# Patient Record
Sex: MALE | Race: WHITE | NOT HISPANIC OR LATINO | Employment: FULL TIME | URBAN - NONMETROPOLITAN AREA
[De-identification: names, ages, dates, MRNs, and addresses within clinical notes are randomized per-mention and may not be internally consistent; named-entity substitution may affect disease eponyms.]

---

## 2023-01-05 ENCOUNTER — HOSPITAL ENCOUNTER (EMERGENCY)
Facility: HOSPITAL | Age: 63
Discharge: HOME | End: 2023-01-05
Attending: EMERGENCY MEDICINE

## 2023-01-05 ENCOUNTER — APPOINTMENT (EMERGENCY)
Dept: RADIOLOGY | Facility: HOSPITAL | Age: 63
End: 2023-01-05

## 2023-01-05 VITALS
OXYGEN SATURATION: 97 % | RESPIRATION RATE: 17 BRPM | HEIGHT: 74 IN | WEIGHT: 230 LBS | SYSTOLIC BLOOD PRESSURE: 199 MMHG | BODY MASS INDEX: 29.52 KG/M2 | TEMPERATURE: 97.8 F | HEART RATE: 55 BPM | DIASTOLIC BLOOD PRESSURE: 100 MMHG

## 2023-01-05 DIAGNOSIS — M25.561 RIGHT KNEE PAIN: Primary | ICD-10-CM

## 2023-01-05 RX ORDER — KETOROLAC TROMETHAMINE 30 MG/ML
15 INJECTION, SOLUTION INTRAMUSCULAR; INTRAVENOUS ONCE
Status: COMPLETED | OUTPATIENT
Start: 2023-01-05 | End: 2023-01-05

## 2023-01-05 RX ADMIN — KETOROLAC TROMETHAMINE 15 MG: 30 INJECTION, SOLUTION INTRAMUSCULAR; INTRAVENOUS at 20:01

## 2023-01-06 NOTE — DISCHARGE INSTRUCTIONS
Please utilize brace and crutches as we discussed  Follow-up with sports medicine  Continue with ice, rest, Tylenol alternating with ibuprofen  Please follow-up with your family doctor as your blood pressure was elevated on arrival and this should be rechecked    Return with any new or worsening symptoms

## 2023-01-06 NOTE — ED PROVIDER NOTES
History  Chief Complaint   Patient presents with   • Knee Pain     Pt having continued knee pain x 2 weeks from an injury  Pt stepped up into truck tonight and he heard a pop  Pt unable to apply pressure and unable to bend right knee  76-year-old male presented emergency department for evaluation of right knee pain  Patient states pain started back in the summer when he was kneeling staining his deck  Ports his PCP thought this may be a minor meniscal tear he was treating with RICE therapy  Patient states symptoms were improving until 2 weeks ago he was kneeling on the knee again reinjured the knee  Patient states he has been again treating with RICE therapy  Reports today when he stepped up into his truck he heard a pop in the knee  States he has been unable to apply pressure to the right knee at this time  Reports difficulty flexing the knee  Denies any weakness or numbness  He denies any tingling  Reports some mild swelling  He is wearing a brace  History provided by:  Patient  Knee Pain  Location:  Knee  Injury: yes    Mechanism of injury comment:  Kneeling on the knee // steping into truck  Knee location:  R knee  Pain details:     Quality:  Shooting    Severity:  Moderate    Onset quality:  Sudden    Timing:  Constant  Relieved by: Ice and rest  Worsened by:  Bearing weight and flexion  Ineffective treatments:  None tried  Associated symptoms: decreased ROM and swelling    Associated symptoms: no back pain, no fatigue, no fever, no itching, no muscle weakness, no neck pain, no numbness, no stiffness and no tingling        None       History reviewed  No pertinent past medical history  History reviewed  No pertinent surgical history  History reviewed  No pertinent family history  I have reviewed and agree with the history as documented      E-Cigarette/Vaping     E-Cigarette/Vaping Substances     Social History     Tobacco Use   • Smoking status: Never   • Smokeless tobacco: Never Substance Use Topics   • Alcohol use: Never   • Drug use: Never       Review of Systems   Constitutional: Negative for fatigue and fever  Musculoskeletal: Positive for arthralgias and joint swelling  Negative for back pain, neck pain and stiffness  Skin: Negative  Negative for itching  All other systems reviewed and are negative  Physical Exam  Physical Exam  Vitals and nursing note reviewed  Constitutional:       General: He is not in acute distress  Appearance: Normal appearance  He is not ill-appearing, toxic-appearing or diaphoretic  HENT:      Head: Normocephalic  Eyes:      Conjunctiva/sclera: Conjunctivae normal    Cardiovascular:      Pulses:           Dorsalis pedis pulses are 2+ on the right side  Posterior tibial pulses are 2+ on the right side  Pulmonary:      Effort: Pulmonary effort is normal    Musculoskeletal:         General: Swelling and tenderness present  Right hip: Normal       Right knee: Swelling and bony tenderness present  No deformity, erythema or ecchymosis  Decreased range of motion  Tenderness present over the medial joint line and lateral joint line  Right ankle: Normal    Skin:     General: Skin is warm and dry  Capillary Refill: Capillary refill takes less than 2 seconds  Findings: No bruising, erythema or rash  Neurological:      General: No focal deficit present  Mental Status: He is alert and oriented to person, place, and time     Psychiatric:         Mood and Affect: Mood normal          Behavior: Behavior normal          Vital Signs  ED Triage Vitals   Temperature Pulse Respirations Blood Pressure SpO2   01/05/23 1838 01/05/23 1838 01/05/23 1838 01/05/23 1838 01/05/23 1838   97 8 °F (36 6 °C) 55 17 (!) 199/100 97 %      Temp Source Heart Rate Source Patient Position - Orthostatic VS BP Location FiO2 (%)   01/05/23 1838 01/05/23 1838 01/05/23 1838 01/05/23 1838 --   Temporal Monitor Standing Right arm       Pain Score 01/05/23 2048       4           Vitals:    01/05/23 1838   BP: (!) 199/100   Pulse: 55   Patient Position - Orthostatic VS: Standing         Visual Acuity      ED Medications  Medications   ketorolac (TORADOL) injection 15 mg (15 mg Intramuscular Given 1/5/23 2001)       Diagnostic Studies  Results Reviewed     None                 XR knee 4+ vw right injury   ED Interpretation by Josr Vázquez PA-C (01/05 2045)   No acute osseous injury                 Procedures  Splint application    Date/Time: 1/5/2023 8:43 PM  Performed by: Josr Vázquez PA-C  Authorized by: Josr Vázquez PA-C   Universal Protocol:  Procedure performed by: Susanna Brennan)  Risks and benefits: risks, benefits and alternatives were discussed  Consent given by: patient  Time out: Immediately prior to procedure a "time out" was called to verify the correct patient, procedure, equipment, support staff and site/side marked as required  Timeout called at: 1/5/2023 8:44 PM   Patient understanding: patient states understanding of the procedure being performed  Patient consent: the patient's understanding of the procedure matches consent given  Radiology Images displayed and confirmed  If images not available, report reviewed: imaging studies available  Patient identity confirmed: verbally with patient and arm band      Pre-procedure details:     Sensation:  Normal  Procedure details:     Laterality:  Right    Location:  Knee    Knee:  R knee    Splint type: static knee immoblizer  Supplies:  Knee immobilizer  Post-procedure details:     Pain:  Unchanged    Sensation:  Normal    Patient tolerance of procedure: Tolerated well, no immediate complications             ED Course  ED Course as of 01/05/23 2049   Thu Jan 05, 2023 2045 ED interpretation of knee x-ray negative for acute osseous injury  Knee immobilizer placed  Patient has crutches    We will follow-up with sports medicine                               SBIRT 20yo+    Flowsheet Row Most Recent Value   SBIRT (22 yo +)    In order to provide better care to our patients, we are screening all of our patients for alcohol and drug use  Would it be okay to ask you these screening questions? No Filed at: 01/05/2023 1842                    Medical Decision Making  34-year-old male presented to the emergency department for evaluation of right knee pain  Acute on chronic injury  Vitals and medical record reviewed  On exam patient has some mild swelling and tenderness to the right knee  He has pain with flexing  ED interpretation of x-ray was negative for acute osseous injury  There is no warmth or redness concerning for infection  Patient denies any fevers  Patient was treated with knee immobilizer has his own crutches  Concern for an ligament injury  Will have patient follow-up with sports medicine  We discussed rice therapy  We discussed appropriate follow-up and strict return precautions and he verbalized understanding  He was clinically and hemodynamically stable for discharge  Of note patient was made aware of elevated blood pressure on arrival and educated to follow-up with his PCP to have this rechecked  Right knee pain: acute illness or injury  Amount and/or Complexity of Data Reviewed  Radiology: ordered and independent interpretation performed  Risk  OTC drugs  Prescription drug management  Risk Details: Discussed alternate between Tylenol and ibuprofen as needed for pain control at home        Disposition  Final diagnoses:   Right knee pain     Time reflects when diagnosis was documented in both MDM as applicable and the Disposition within this note     Time User Action Codes Description Comment    1/5/2023  8:35 PM Promise Dunn Right knee pain       ED Disposition     ED Disposition   Discharge    Condition   Stable    Date/Time   Thu Jan 5, 2023  8:35 PM    Fito8 Abraham Perez discharge to home/self care                 Follow-up Information Follow up With Specialties Details Why [id number] aTlha Rehman MD [facility]   Walker Kcarez [address] Orthopedic Surgery   [address]  [address] [address]  Denia Diop MD Sports Medicine   [address]  [address]  [facility] [address] [facility]  [phone]            Patient's Medications    No medications on file           PDMP Review     None          ED Provider  Electronically Signed by           Herlinda Cabrera PA-C  01/05/23 4977

## 2023-01-09 VITALS
HEIGHT: 74 IN | DIASTOLIC BLOOD PRESSURE: 90 MMHG | TEMPERATURE: 97.7 F | SYSTOLIC BLOOD PRESSURE: 180 MMHG | HEART RATE: 99 BPM | BODY MASS INDEX: 29.52 KG/M2 | WEIGHT: 230 LBS

## 2023-01-09 DIAGNOSIS — R03.0 ELEVATED BLOOD PRESSURE READING: ICD-10-CM

## 2023-01-09 DIAGNOSIS — G89.29 CHRONIC PAIN OF RIGHT KNEE: Primary | ICD-10-CM

## 2023-01-09 DIAGNOSIS — M25.661 KNEE STIFFNESS, RIGHT: ICD-10-CM

## 2023-01-09 DIAGNOSIS — S89.91XA INJURY OF RIGHT KNEE, INITIAL ENCOUNTER: ICD-10-CM

## 2023-01-09 DIAGNOSIS — M25.561 CHRONIC PAIN OF RIGHT KNEE: Primary | ICD-10-CM

## 2023-01-09 DIAGNOSIS — M17.12 PRIMARY OSTEOARTHRITIS OF LEFT KNEE: ICD-10-CM

## 2023-01-09 RX ORDER — AMLODIPINE BESYLATE 5 MG/1
5 TABLET ORAL DAILY
COMMUNITY
Start: 2023-01-02

## 2023-01-09 NOTE — PROGRESS NOTES
1  Chronic pain of right knee  Ambulatory Referral to Sports Medicine    Ambulatory Referral to Physical Therapy      2  Injury of right knee, initial encounter  Ambulatory Referral to Physical Therapy      3  Knee stiffness, right  Ambulatory Referral to Physical Therapy      4  Primary osteoarthritis of left knee  Ambulatory Referral to Physical Therapy      5  Elevated blood pressure reading          Orders Placed This Encounter   Procedures   • Ambulatory Referral to Physical Therapy        Imaging Studies (I personally reviewed images in PACS and report):    • X-ray right knee 1/5/2023: No acute osseous abnormalities  No joint effusion  Mild osteoarthritic changes with small marginal osteophytes  Atherosclerotic calcifications  IMPRESSION:  • Acute on chronic right knee pain  • Reportedly injured in the summer 2022 while kneeling feeling a popping sensation  He was mainly located over the anteromedial aspect of his knee  Treated conservatively at first but then reaggravated the pain recently over the past few weeks while pushing on his right foot while attempting to go into a truck  • Radiographic imaging unremarkable other than degenerative changes  • Pain improving since injury but does have expected general knee stiffness due to knee immobilizer use and crutches  • Differential includes MCL sprain, osteoarthritic pain, medial meniscal injury/flare, Baker's cyst disruption/proximal calf strain  • Separate issue-elevated blood pressure reading-likely from whitecoat syndrome versus uncontrolled hypertension    PLAN:    • Clinical exam and radiographic imaging reviewed with patient today, with impression as per above  I have discussed with the patient the pathophysiology of this diagnosis and reviewed how the examination correlates with this diagnosis  • Prior imaging reviewed of his right knee as noted above with patient    • Recommended patient transition out of his knee immobilizer brace and weight-bear as tolerated without use of crutches  I counseled he can continue use of his hinged knee brace to provide support while recovering  • In regards to pain control recommended  Use of acetaminophen, NSAIDs, heat/ice therapy 20 minutes on/off  Offered an increased dose of naproxen, but patient deferred  I did offer a an intra-articular cortisone injection but counseled that he would likely not be able to undergo surgical intervention for his knee for at least 6 to 8 weeks after his injection if it does not provide sufficient relief  Patient preferred to defer this option for now as his pain has been improving  • Furthermore, in regards to improving his pain and overall function of his right knee, I have referred him to formal physical therapy as well  • Patient declined need for work accommodations note  • Regards was elevated blood pressure reading, I recommended he obtain his blood pressure reading when he was at home and if he continues to have elevated blood pressure readings greater than 130/90 consistently, that he should reach out to his PCP  Return in about 3 weeks (around 1/30/2023)  If patient is improving, I recommend he continue physical therapy  If he is not improving or worsening, may consider obtaining an MRI of his right knee without contrast or providing an intra-articular cortisone injection  Portions of the record may have been created with voice recognition software  Occasional wrong word or "sound a like" substitutions may have occurred due to the inherent limitations of voice recognition software  Read the chart carefully and recognize, using context, where substitutions have occurred           CHIEF COMPLAINT:  Chief Complaint   Patient presents with   • Right Knee - Pain         HPI:  Mikhail Ivory is a 60 y o  male  who presents with his significant other for       Visit 1/9/2023 :  Initial evaluation of right knee pain:  Patient reports an old injury from the summer 2022 while kneeling  He states that he was working on his deck and felt a tearing sensation over the anteromedial aspect of his knee  He states he had seen his PCP who had considered a medial meniscal injury as a possible cause and treated him with conservative treatments  He states he has been doing well until approximately 2 and half weeks ago when patient was attempting to step up into his truck  He states he was trying to push off his right foot and twisted his knee which caused a popping sensation and immediate pain over the medial and posterior aspect of his knee that brought him to tears reportedly  He states he had difficulty weightbearing at first and had swelling  He then treated by using a hinged knee brace along with icing, NSAIDs, acetaminophen all of which provide relief  He states he eventually went to the ER on 1/5/2023 where he had imaging done as noted above  He was then placed in a knee immobilizer and crutches and has not been weightbearing since being in the ER while using the knee immobilizer brace  He states today that his pain has been progressively improving and that he generally has a general sense of discomfort throughout his knee along with tightness  He states the swelling is improved  He states when he does have pain is over the posterior aspect of his knee  Describes as an aching pain  Denies any discoloration of his knee, numbness/tingling  He states he did take an Aleve this morning but only because he thought he had to and not that he was in severe pain  He has not seen formal physical therapy for this issue before  He states he did play football in his younger years for 10+ years but does not feel he was diagnosed with any injury and did not undergo any surgery of his right knee in the past   Denies F/C  Initial elevated blood pressure reading today  Patient denies chest pain, shortness of breath, headaches    He states when he checks his blood pressure at home is much more improved  He states he was nervous for his visit today  Medical, Surgical, Family, and Social History    Past Medical History:   Diagnosis Date   • Hypertension      Past Surgical History:   Procedure Laterality Date   • HAND SURGERY       Social History   Social History     Substance and Sexual Activity   Alcohol Use Never     Social History     Substance and Sexual Activity   Drug Use Never     Social History     Tobacco Use   Smoking Status Never   Smokeless Tobacco Never     History reviewed  No pertinent family history  No Known Allergies       Physical Exam  BP (!) 180/90 (BP Location: Left arm)   Pulse 99   Temp 97 7 °F (36 5 °C) (Temporal)   Ht 6' 2" (1 88 m)   Wt 104 kg (230 lb)   BMI 29 53 kg/m²     Constitutional:  see vital signs  Gen: well-developed, normocephalic/atraumatic, well-groomed  Pulmonary/Chest: Effort normal  No respiratory distress  Ortho Exam  Right Knee Exam: Patient was seen today in a knee immobilizer along with crutches avoiding weightbearing on his right lower extremity    This was removed for examination  Erythema: no  Swelling: no  Increased Warmth: no  Tenderness: none  ROM: 0-100 (patient is able to actively and I am able to passively flex his knee further, but he states pain over his general knee worsens with further flexion)  Knee flexion strength: 5/5  Knee extension strength: 5/5  Patellar Apprehension: negative  Patellar Grind: negative  Lachman's: negative  Anterior Drawer: negative  Varus laxity: negative, but +pain  Valgus laxity: negative, but +pain  [facility]: negative   Thessaly Test: +pain  Dial Test: negative        Sensation intact to light touch      Right hip ROM demonstrates no pain actively or passively      RIGHT ACHILLES EXAMINATION:  Simmonds’ Triad:  Palpable Gap or Defect of Achilles: none  Angle of Declination: angle of baseline plantarflexion symmetric to contralateral side  Matles Test (patient prone, intact and symmetric plantarflexion of ankle when flexing knee): intact  Akins's Calf Squeeze Test: intact obligatory plantarflexion          Procedures

## 2023-01-12 ENCOUNTER — EVALUATION (OUTPATIENT)
Dept: PHYSICAL THERAPY | Facility: CLINIC | Age: 63
End: 2023-01-12

## 2023-01-12 DIAGNOSIS — M25.661 KNEE STIFFNESS, RIGHT: ICD-10-CM

## 2023-01-12 DIAGNOSIS — M25.561 CHRONIC PAIN OF RIGHT KNEE: ICD-10-CM

## 2023-01-12 DIAGNOSIS — G89.29 CHRONIC PAIN OF RIGHT KNEE: ICD-10-CM

## 2023-01-12 DIAGNOSIS — M17.12 PRIMARY OSTEOARTHRITIS OF LEFT KNEE: ICD-10-CM

## 2023-01-12 DIAGNOSIS — S89.91XA INJURY OF RIGHT KNEE, INITIAL ENCOUNTER: ICD-10-CM

## 2023-01-12 NOTE — PROGRESS NOTES
PT Evaluation     Today's date: 2023  Patient name: Heather Martin  : 1960  MRN: [id number]  Referring provider: Travon Padron MD  Dx:   Encounter Diagnosis     ICD-10-CM    1  Chronic pain of right knee  M25 561 Ambulatory Referral to Physical Therapy    G89 29       2  Injury of right knee, initial encounter  S89  91XA Ambulatory Referral to Physical Therapy      3  Knee stiffness, right  M25 661 Ambulatory Referral to Physical Therapy      4  Primary osteoarthritis of left knee  M17 12 Ambulatory Referral to Physical Therapy          Start Time: 1555  Stop Time: 1640  Total time in clinic (min): 45 minutes    Assessment  Assessment details: Pt is a 60year old male who presents to OP PT for chronic R knee pain due to several injury episodes over the past few months  Upon examination, patient presents with decreased ROM with stiffness at end range, decreased patellar mobility, decreased strength and fair quad strength  Due to his current impairments patient has difficulty with prolonged standing, ambulation and stairs  Pt would benefit from OP PT services in order to address current impairments and functional limitations  Thank you for your referral!    Impairments: abnormal or restricted ROM, activity intolerance, impaired balance, impaired physical strength, lacks appropriate home exercise program and pain with function    Goals  STG (to be met within 4 weeks):  1  Pt will improve R knee pain to no more than 2/10 at worst in order to improve gait quality  2  Pt will improve R knee ROM by at least 5 * in order to normalize gait pattern  3  Pt will improve quadricep contraction to good in order to improve stability in SL stance  4  Pt will improve R knee strength by at least 1/2 grade in order to negotiate curb step  6  Pt to improve FOTO score by at least 10 points in order to progress to PLOF    LTG (to bet met in 10 weeks):  1   Pt will be able to perform all activities without R knee pain in order to maximize independence  2  Pt will restore WFL R knee ROM in order to perform all functional activities  3  Pt will restore WFL R knee strength in order to return to hobbies  4  Pt will be able to ascend/descend 1 flight of stairs with reciprocal gait pattern in order to return PLOF  5  Pt to meet FOTO discharge score in order to improve QOL and maximize independence        Plan  Patient would benefit from: skilled physical therapy  Planned modality interventions: thermotherapy: hydrocollator packs, unattended electrical stimulation and cryotherapy  Planned therapy interventions: joint mobilization, manual therapy, patient education, neuromuscular re-education, strengthening, stretching, therapeutic exercise, home exercise program and balance  Frequency: 2x week  Duration in weeks: 6  Treatment plan discussed with: patient        Subjective Evaluation    History of Present Illness  Mechanism of injury: Pt reports staining his deck in July of 2022 when his knee slipped off the foam pad  He was seeing his PCP a week later and mentioned his knee pain, he was told it may be a injury to his meniscus  He wore a copper fit brace for some support  Between [facility] and [place] he had to knee on his knees again and felt sharp pain  He was unable to bear much weight onto his knee  He then went to get into his truck one week ago when he felt a posterior pop in his knee with severe pain  He drove home and was seen in ED  He was put into an immobilizer with crutches followed up with ortho  Pt was given the option of injection or therapy; chose therapy and will f/u with referring provider on 2/2      Diagnostic Tests  X-ray: normal (normal degenerative changes)  Treatments  Current treatment: physical therapy  Patient Goals  Patient goals for therapy: increased strength, independence with ADLs/IADLs, return to sport/leisure activities, increased motion, decreased pain and improved balance          Objective     Palpation     Right Tenderness of the lateral gastrocnemius and medial gastrocnemius  Tenderness     Right Knee   No tenderness in the lateral joint line and medial joint line  Neurological Testing     Sensation     Knee   Left Knee   Intact: light touch    Right Knee   Intact: light touch     Reflexes   Left   Patellar (L4): normal (2+)  Achilles (S1): normal (2+)    Right   Patellar (L4): normal (2+)  Achilles (S1): normal (2+)    Active Range of Motion   Left Knee   Normal active range of motion    Right Knee   Flexion: 121 degrees   Extension: 6 degrees     Passive Range of Motion     Right Knee   Flexion: WFL and with pain  Extension: 3 degrees with pain    Strength/Myotome Testing     Left Knee   Flexion: 4+  Extension: 4+  Quadriceps contraction: good    Right Knee   Flexion: 4  Extension: 4  Quadriceps contraction: fair      Flowsheet Rows    Flowsheet Row Most Recent Value   PT/OT G-Codes    Current Score 49   Projected Score 71             Precautions:     Manuals 1/12       PROM c mobz        Patellar Mobility                Neuro Re-Ed        Quad set supine                TherEx        Bike        SAQ        SLR F/A/E        SL mayela Armenta        Lungflaca (Fwd/Lat/Retro)                Instructed HEP & education 15'                       Modalities                   Access Code: [email or web address]  URL: [email or web address]  Date: 01/12/2023  Prepared by: Kenny Favors    Exercises  Supine Quad Set - 2 x daily - 7 x weekly - 2 sets - 10 reps  Supine Short Arc Quad - 2 x daily - 7 x weekly - 2 sets - 10 reps  Gastroc Stretch on Wall - 2 x daily - 7 x weekly - 1 sets - 5 reps - :15 hold

## 2023-01-16 ENCOUNTER — OFFICE VISIT (OUTPATIENT)
Dept: PHYSICAL THERAPY | Facility: CLINIC | Age: 63
End: 2023-01-16

## 2023-01-16 DIAGNOSIS — M25.661 KNEE STIFFNESS, RIGHT: ICD-10-CM

## 2023-01-16 DIAGNOSIS — M25.561 CHRONIC PAIN OF RIGHT KNEE: Primary | ICD-10-CM

## 2023-01-16 DIAGNOSIS — M17.12 PRIMARY OSTEOARTHRITIS OF LEFT KNEE: ICD-10-CM

## 2023-01-16 DIAGNOSIS — S89.91XA INJURY OF RIGHT KNEE, INITIAL ENCOUNTER: ICD-10-CM

## 2023-01-16 DIAGNOSIS — G89.29 CHRONIC PAIN OF RIGHT KNEE: Primary | ICD-10-CM

## 2023-01-16 NOTE — PROGRESS NOTES
Daily Note     Today's date: 2023  Patient name: Jakob Frausto  : 1960  MRN: [id number]  Referring provider: Mechelle Odonnell MD  Dx:   Encounter Diagnosis     ICD-10-CM    1  Chronic pain of right knee  M25 561     G89 29       2  Injury of right knee, initial encounter  S89  91XA       3  Knee stiffness, right  M25 661       4  Primary osteoarthritis of left knee  M17 12                      Subjective: Pt notes HEP is going well, continues with popping at lateral patella      Objective: See treatment diary below      Assessment:  Pt tolerated treatment session fairly well  PT notes inconsistent painful, popping of lateral patella when transitioning into and out of TKE  Modified exercises appropriately to decrease symptoms  Pt would benefit from continued OP PT services  Plan: Continue per plan of care  Precautions:     Manuals       PROM c mobz, quad stretching  10'      Patellar Mobility  5'              Neuro Re-Ed        Quad set supine  15x :05      Wobble board  1' ea                      TherEx        [facility] 10'  L1      LAQ/SAQ  4# 2x10, 2x10      SLR F/A/E        SL clamshells  GTB 2x10      Bridges  2x10      Leg press DL, SL  65# 2x10, 45# 2x10      Lunges (Fwd/Lat/Retro)                Instructed HEP & education 15'                       Modalities                   Access Code: [email or web address]  URL: [email or web address]  Date: 2023  Prepared by: Ignacio How    Exercises  Supine Quad Set - 2 x daily - 7 x weekly - 2 sets - 10 reps  Supine Short Arc Quad - 2 x daily - 7 x weekly - 2 sets - 10 reps  Gastroc Stretch on Wall - 2 x daily - 7 x weekly - 1 sets - 5 reps - :15 hold

## 2023-01-18 ENCOUNTER — OFFICE VISIT (OUTPATIENT)
Dept: PHYSICAL THERAPY | Facility: CLINIC | Age: 63
End: 2023-01-18

## 2023-01-18 DIAGNOSIS — M25.561 CHRONIC PAIN OF RIGHT KNEE: Primary | ICD-10-CM

## 2023-01-18 DIAGNOSIS — G89.29 CHRONIC PAIN OF RIGHT KNEE: Primary | ICD-10-CM

## 2023-01-18 DIAGNOSIS — M25.661 KNEE STIFFNESS, RIGHT: ICD-10-CM

## 2023-01-18 DIAGNOSIS — S89.91XA INJURY OF RIGHT KNEE, INITIAL ENCOUNTER: ICD-10-CM

## 2023-01-18 DIAGNOSIS — M17.12 PRIMARY OSTEOARTHRITIS OF LEFT KNEE: ICD-10-CM

## 2023-01-18 NOTE — PROGRESS NOTES
Daily Note     Today's date: 2023  Patient name: Franklin Trejo  : 1960  MRN: [id number]  Referring provider: Ingrid Rosales MD  Dx:   Encounter Diagnosis     ICD-10-CM    1  Chronic pain of right knee  M25 561     G89 29       2  Injury of right knee, initial encounter  S89  91XA       3  Knee stiffness, right  M25 661       4  Primary osteoarthritis of left knee  M17 12                      Subjective: Patient reports clicking, popping in the lateral,posterior R knee today  Objective: See treatment diary below      Assessment: Tolerated treatment well  Patient exhibited good technique with therapeutic exercises and would benefit from continued PT to increase R knee ROM/strength and endurance to improve mobility  Plan: Continue per plan of care  Precautions:     Manuals      PROM c mobz, quad stretching  10' 10'w/STM     Patellar Mobility  5' 10'             Neuro Re-Ed        Quad set supine  15x :05 15x5"     Wobble board  1' ea 1'ea                     TherEx        Bike  [address] [facility] 10'  L1 [address] [facility] L1     LAQ/SAQ  4# 2x10, 2x10 LAQ 4# 2x10     SLR F/A/E        SL clamshells  GTB 2x10 GTB 2x10     Bridges  2x10 2x10     Leg press DL, SL  65# 2x10, 45# 2x10 65# 2x10  45# 2x10     Gastroc/SoleusStretch on Step   3x15" ea bilat  6"step      Lunges (Fwd/Lat/Retro)                Instructed HEP & education 15'                       Modalities                   Access Code: [email or web address]  URL: [email or web address]  Date: 2023  Prepared by: Krishna Lassiter    Exercises  Supine Quad Set - 2 x daily - 7 x weekly - 2 sets - 10 reps  Supine Short Arc Quad - 2 x daily - 7 x weekly - 2 sets - 10 reps  Gastroc Stretch on Wall - 2 x daily - 7 x weekly - 1 sets - 5 reps - :15 hold

## 2023-01-23 ENCOUNTER — OFFICE VISIT (OUTPATIENT)
Dept: PHYSICAL THERAPY | Facility: CLINIC | Age: 63
End: 2023-01-23

## 2023-01-23 DIAGNOSIS — M17.12 PRIMARY OSTEOARTHRITIS OF LEFT KNEE: ICD-10-CM

## 2023-01-23 DIAGNOSIS — M25.561 CHRONIC PAIN OF RIGHT KNEE: Primary | ICD-10-CM

## 2023-01-23 DIAGNOSIS — G89.29 CHRONIC PAIN OF RIGHT KNEE: Primary | ICD-10-CM

## 2023-01-23 DIAGNOSIS — S89.91XA INJURY OF RIGHT KNEE, INITIAL ENCOUNTER: ICD-10-CM

## 2023-01-23 DIAGNOSIS — M25.661 KNEE STIFFNESS, RIGHT: ICD-10-CM

## 2023-01-23 NOTE — PROGRESS NOTES
Daily Note     Today's date: 2023  Patient name: Reyna Andrea  : 1960  MRN: [id number]  Referring provider: Brandon Oscar MD  Dx:   Encounter Diagnosis     ICD-10-CM    1  Chronic pain of right knee  M25 561     G89 29       2  Injury of right knee, initial encounter  S89  91XA       3  Knee stiffness, right  M25 661       4  Primary osteoarthritis of left knee  M17 12                      Subjective: Patient reports that he is tolerating his PT well with only minor soreness  "The only thing I'm minding today is cramping in my L hamstring "  Patient reports decrease in episodes with his R knee locking up  Objective: See treatment diary below      Assessment: Tolerated treatment well  Patient exhibited good technique with therapeutic exercises and would benefit from continued PT to increase R knee ROM/strength and endurance to improve mobility  Plan: Continue per plan of care  Precautions:     Manuals     PROM c mobz, quad stretching  10' 10'w/STM 10' w/STM    Patellar Mobility  5' 10' 5'            Neuro Re-Ed       Quad set supine  15x :05 15x5" 2x10 5"hold    Wobble board  1' ea 1'ea 1'ea                    TherEx       Bike  [address] [facility] 10'  L1 [address] [facility] L1 [address] [facility] 10'L1    LAQ/SAQ  4# 2x10, 2x10 LAQ 4# 2x10 LAQ 4# 2x10    SLR F/A/E        SL clamshells  GTB 2x10 GTB 2x10 2x10 GTB    Bridges  2x10 2x10 2x10 GTB    Leg press DL, SL  65# 2x10, 45# 2x10 65# 2x10  45# 2x10 65# 2x10  55# 2x10    Gastroc/SoleusStretch on Step   3x15" ea bilat  6"step  3x15"ea  6"step    Lunges (Fwd/Lat/Retro)                Instructed HEP & education 15'                       Modalities                  Access Code: [email or web address]  URL: [email or web address]  Date: 2023  Prepared by: Reather     Exercises  Supine Quad Set - 2 x daily - 7 x weekly - 2 sets - 10 reps  Supine Short Arc Quad - 2 x daily - 7 x weekly - 2 sets - 10 reps  Gastroc Stretch on Wall - 2 x daily - 7 x weekly - 1 sets - 5 reps - :15 hold

## 2023-01-25 ENCOUNTER — OFFICE VISIT (OUTPATIENT)
Dept: PHYSICAL THERAPY | Facility: CLINIC | Age: 63
End: 2023-01-25

## 2023-01-25 DIAGNOSIS — G89.29 CHRONIC PAIN OF RIGHT KNEE: Primary | ICD-10-CM

## 2023-01-25 DIAGNOSIS — S89.91XA INJURY OF RIGHT KNEE, INITIAL ENCOUNTER: ICD-10-CM

## 2023-01-25 DIAGNOSIS — M17.12 PRIMARY OSTEOARTHRITIS OF LEFT KNEE: ICD-10-CM

## 2023-01-25 DIAGNOSIS — M25.561 CHRONIC PAIN OF RIGHT KNEE: Primary | ICD-10-CM

## 2023-01-25 DIAGNOSIS — M25.661 KNEE STIFFNESS, RIGHT: ICD-10-CM

## 2023-01-25 NOTE — PROGRESS NOTES
Daily Note     Today's date: 2023  Patient name: Jossue Newby  : 1960  MRN: [id number]  Referring provider: Emperatriz Stewart MD  Dx:   Encounter Diagnosis     ICD-10-CM    1  Chronic pain of right knee  M25 561     G89 29       2  Injury of right knee, initial encounter  S89  91XA       3  Knee stiffness, right  M25 661       4  Primary osteoarthritis of left knee  M17 12                      Subjective: Patient reports that he is pleased with his progress  "My knee has not been locking since I've been coming for PT "  "Even my coworkers have noticed that my walking has improved "      Objective: See treatment diary below      Assessment: Tolerated treatment well  Patient exhibited good technique with therapeutic exercises and would benefit from continued PT to increase R knee ROM/strength and endurance to improve mobility  Decreased spasm palpated in posterior R distal hamstring/proximal gastroc region since start of PT  Plan: Continue per plan of care        Precautions:     Manuals    PROM c mobz, quad stretching  10' 10'w/STM 10' w/STM 10' w/STM   Patellar Mobility  5' 10' 5' 5'           Neuro Re-Ed      Quad set supine  15x :05 15x5" 2x10 5"hold 2x10 5"hold   Wobble board  1' ea 1'ea 1'ea NV                   TherEx      Bike  [address] [facility] 10'  L1 [address] [facility] L1 [address] [facility] 10'L1 [facility] 10'L5   LAQ/SAQ  4# 2x10, 2x10 LAQ 4# 2x10 LAQ 4# 2x10 LAQ 4# 2x10   SLR F/A/E        SL clamshells  GTB 2x10 GTB 2x10 2x10 GTB 2x10 GTB   Bridges  2x10 2x10 2x10 GTB 2x10 GTB   Leg press DL, SL  65# 2x10, 45# 2x10 65# 2x10  45# 2x10 65# 2x10  55# 2x10 75# 2x10  55# 2x10   Gastroc/SoleusStretch on Step   3x15" ea bilat  6"step  3x15"ea  6"step 3x15"ea 6"step   Lunges (Fwd/Lat/Retro)        [employer] Walkout     Fwd, Retro 15# 3 Laps   Instructed HEP & education 15'                       Modalities                 Access Code: [email or web address]  URL: [email or web address]  Date: 01/12/2023  Prepared by: Jannet Connors    Exercises  Supine Quad Set - 2 x daily - 7 x weekly - 2 sets - 10 reps  Supine Short Arc Quad - 2 x daily - 7 x weekly - 2 sets - 10 reps  Gastroc Stretch on Wall - 2 x daily - 7 x weekly - 1 sets - 5 reps - :15 hold

## 2023-01-30 ENCOUNTER — OFFICE VISIT (OUTPATIENT)
Dept: PHYSICAL THERAPY | Facility: CLINIC | Age: 63
End: 2023-01-30

## 2023-01-30 DIAGNOSIS — G89.29 CHRONIC PAIN OF RIGHT KNEE: Primary | ICD-10-CM

## 2023-01-30 DIAGNOSIS — M17.12 PRIMARY OSTEOARTHRITIS OF LEFT KNEE: ICD-10-CM

## 2023-01-30 DIAGNOSIS — M25.561 CHRONIC PAIN OF RIGHT KNEE: Primary | ICD-10-CM

## 2023-01-30 DIAGNOSIS — S89.91XA INJURY OF RIGHT KNEE, INITIAL ENCOUNTER: ICD-10-CM

## 2023-01-30 DIAGNOSIS — M25.661 KNEE STIFFNESS, RIGHT: ICD-10-CM

## 2023-01-30 NOTE — PROGRESS NOTES
Daily Note     Today's date: 2023  Patient name: Suzi Harris  : 1960  MRN: [id number]  Referring provider: Concetta Asencio MD  Dx:   Encounter Diagnosis     ICD-10-CM    1  Chronic pain of right knee  M25 561     G89 29       2  Injury of right knee, initial encounter  S89  91XA       3  Knee stiffness, right  M25 661       4  Primary osteoarthritis of left knee  M17 12                      Subjective: Pt reports continued improvements in symptoms and function; no new complaints at this time      Objective: See treatment diary below      Assessment:  Pt tolerated treatment session well  Able to progress some activity, started prone QS and reported one "popping" episode  Otherwise no sx's  Pt would benefit from continued OP PT services  Plan: Continue per plan of care  Precautions:     Manuals    PROM c mobz, quad stretching 10' 10' 10'w/STM 10' w/STM 10' w/STM   Patellar Mobility 5' 5' 10' 5' 5'           Neuro Re-Ed      Quad set supine Prone 10x :05 15x :05 15x5" 2x10 5"hold 2x10 5"hold   Wobble board  1' ea 1'ea 1'ea NV                   TherEx      Bike [facility] 10'L5 [facility] 10'  L1 [facility] L1 [facility] 10'L1 [facility] 10'L5   LAQ/SAQ  4# 2x10, 2x10 LAQ 4# 2x10 LAQ 4# 2x10 LAQ 4# 2x10   SLR F/A/E        SL clamshells 2x10 BluTB GTB 2x10 GTB 2x10 2x10 GTB 2x10 GTB   Bridges 2x10 BluTB 2x10 2x10 2x10 GTB 2x10 GTB   Leg press DL, SL 75# 2x10  55# 2x10 65# 2x10, 45# 2x10 65# 2x10  45# 2x10 65# 2x10  55# 2x10 75# 2x10  55# 2x10   Gastroc/SoleusStretch on Step 3x15"ea 6"step  3x15" ea bilat  6"step  3x15"ea  6"step 3x15"ea 6"step   Lunges (Fwd/Lat/Retro)        [employer] Walkout Fwd, Retro 15# 4 Laps    Fwd, Retro 15# 3 Laps   Instructed HEP & education                        Modalities                 Access Code: [email or web address]  URL: [email or web address]  Date: 2023  Prepared by: Laura Ivory    Exercises  Supine Quad Set - 2 x daily - 7 x weekly - 2 sets - 10 reps  Supine Short Arc Quad - 2 x daily - 7 x weekly - 2 sets - 10 reps  Gastroc Stretch on Wall - 2 x daily - 7 x weekly - 1 sets - 5 reps - :15 hold

## 2023-02-01 ENCOUNTER — OFFICE VISIT (OUTPATIENT)
Dept: PHYSICAL THERAPY | Facility: CLINIC | Age: 63
End: 2023-02-01

## 2023-02-01 DIAGNOSIS — M17.12 PRIMARY OSTEOARTHRITIS OF LEFT KNEE: ICD-10-CM

## 2023-02-01 DIAGNOSIS — M25.661 KNEE STIFFNESS, RIGHT: ICD-10-CM

## 2023-02-01 DIAGNOSIS — S89.91XA INJURY OF RIGHT KNEE, INITIAL ENCOUNTER: ICD-10-CM

## 2023-02-01 DIAGNOSIS — G89.29 CHRONIC PAIN OF RIGHT KNEE: Primary | ICD-10-CM

## 2023-02-01 DIAGNOSIS — M25.561 CHRONIC PAIN OF RIGHT KNEE: Primary | ICD-10-CM

## 2023-02-01 NOTE — PROGRESS NOTES
Daily Note     Today's date: 2023  Patient name: Denice Guadarrama  : 1960  MRN: [id number]  Referring provider: Baudilio Poole MD  Dx:   Encounter Diagnosis     ICD-10-CM    1  Chronic pain of right knee  M25 561     G89 29       2  Injury of right knee, initial encounter  S89  91XA       3  Knee stiffness, right  M25 661       4  Primary osteoarthritis of left knee  M17 12                      Subjective: Pt reports continued improvements in symptoms and function; no new complaints at this time      Objective: See treatment diary below      Assessment:  Pt tolerated treatment session well  Progressing program to challenge LE strength and stability  Will f/u with referring physician tomorrow  Pt would benefit from continued OP PT services  Plan: Continue per plan of care        Precautions:     Manuals    PROM c dmitryz, quad stretching 10' 10' 10'w/STM 10' w/STM 10' w/STM   Patellar Mobility 5' 5' 10' 5' 5'           Neuro Re-Ed      Quad set supine Prone 10x :05 Prone 2x10 :05 15x5" 2x10 5"hold 2x10 5"hold   Wobble board  1' ea 1'ea 1'ea NV   BOSU march  3'      BOSU SLR  10x bilat      BOSU squats  2x10              TherEx      Bike [address] [facility] 10'L5 [address] [facility] 10'L5 [address] [facility] L1 [address] [facility] 10'L1 [address] [facility] 10'L5   LAQ/SAQ  LAQ 5# 2x10 LAQ 4# 2x10 LAQ 4# 2x10 LAQ 4# 2x10   SLR F/A/E        SL clamshells 2x10 BluTB 2x10 BluTB GTB 2x10 2x10 GTB 2x10 GTB   Bridges 2x10 BluTB 2x10 BluTB 2x10 2x10 GTB 2x10 GTB   Leg press DL, SL 75# 2x10  55# 2x10 85# 2x10  55# 2x10 65# 2x10  45# 2x10 65# 2x10  55# 2x10 75# 2x10  55# 2x10   Gastroc/SoleusStretch on Step 3x15"ea 6"step 3x15"ea 6"step 3x15" ea bilat  6"step  3x15"ea  6"step 3x15"ea 6"step   Lunges (Fwd/Lat/Retro)        [employer] Walkout Fwd, Retro 15# 4 Laps Fwd, Retro 15# 4 Laps   Fwd, Retro 15# 3 Laps   Instructed HEP & education                        Modalities                 Access Code: [email or web address]  URL: [email or web address]  Date: 01/12/2023  Prepared by: Augusto Larger    Exercises  Supine Quad Set - 2 x daily - 7 x weekly - 2 sets - 10 reps  Supine Short Arc Quad - 2 x daily - 7 x weekly - 2 sets - 10 reps  Gastroc Stretch on Wall - 2 x daily - 7 x weekly - 1 sets - 5 reps - :15 hold

## 2023-02-02 ENCOUNTER — OFFICE VISIT (OUTPATIENT)
Dept: OBGYN CLINIC | Facility: CLINIC | Age: 63
End: 2023-02-02

## 2023-02-02 VITALS
SYSTOLIC BLOOD PRESSURE: 136 MMHG | TEMPERATURE: 97.8 F | WEIGHT: 230 LBS | HEIGHT: 74 IN | DIASTOLIC BLOOD PRESSURE: 80 MMHG | HEART RATE: 80 BPM | BODY MASS INDEX: 29.52 KG/M2

## 2023-02-02 DIAGNOSIS — M17.12 PRIMARY OSTEOARTHRITIS OF LEFT KNEE: ICD-10-CM

## 2023-02-02 DIAGNOSIS — G89.29 CHRONIC PAIN OF RIGHT KNEE: Primary | ICD-10-CM

## 2023-02-02 DIAGNOSIS — S89.91XD INJURY OF RIGHT KNEE, SUBSEQUENT ENCOUNTER: ICD-10-CM

## 2023-02-02 DIAGNOSIS — M25.661 KNEE STIFFNESS, RIGHT: ICD-10-CM

## 2023-02-02 DIAGNOSIS — M25.561 CHRONIC PAIN OF RIGHT KNEE: Primary | ICD-10-CM

## 2023-02-02 NOTE — PROGRESS NOTES
1  Chronic pain of right knee        2  Injury of right knee, subsequent encounter        3  Knee stiffness, right        4  Primary osteoarthritis of left knee          No orders of the defined types were placed in this encounter  Imaging Studies (I personally reviewed images in PACS and report):    • X-ray right knee 1/5/2023: No acute osseous abnormalities  No joint effusion  Mild osteoarthritic changes with small marginal osteophytes  Atherosclerotic calcifications  IMPRESSION:  • Acute on chronic right knee pain  • Reportedly injured in the summer 2022 while kneeling feeling a popping sensation  He was mainly located over the anteromedial aspect of his knee  Treated conservatively at first but then reaggravated the pain recently over the past few weeks while pushing on his right foot while attempting to go into a truck  • Radiographic imaging unremarkable other than degenerative changes  • Pain improving since injury but does have expected general knee stiffness due to knee immobilizer use and crutches  • Differential includes MCL sprain, osteoarthritic pain, medial meniscal injury/flare, Baker's cyst disruption/proximal calf strain  • Significant improvement since last visit status post formal physical therapy    PLAN:    • Clinical exam and radiographic imaging reviewed with patient today, with impression as per above  I have discussed with the patient the pathophysiology of this diagnosis and reviewed how the examination correlates with this diagnosis  • Reassured patient that he is appropriately responding to formal physical therapy since last visit and based my clinical exam I do not feel further intervention or imaging is warranted at this time    • Counseled he can complete his last 2 sessions of formal physical therapy before being transition to a home exercise program   I counseled the importance of maintaining a home exercise program to prevent recurrence of this pain in the future  Return if symptoms worsen or fail to improve  Portions of the record may have been created with voice recognition software  Occasional wrong word or "sound a like" substitutions may have occurred due to the inherent limitations of voice recognition software  Read the chart carefully and recognize, using context, where substitutions have occurred  CHIEF COMPLAINT:  Follow up right knee injury    HPI:  Ardena Siemens is a 60 y o  male  who presents for     Visit 2/2/2023: Follow-up right knee injury status post formal physical therapy:  Patient reports significant improvement since last visit  He states he has minimal to no pain since attending therapy  He states he will intermittently have a popping/clicking sensation with pain over the inferolateral aspect of his patella during certain maneuvers but otherwise he has been pain-free in regards to standing, walking  He states he has been able to tolerate activities of daily living and sleeping at night without issue  He is no longer taking pain medications  He does wear a compression knee sleeve with activities  Denies any sensation of his knee giving out or locking in extension  He states he has 2 more sessions of formal PT but otherwise he feels he can complete the exercises he learned from PT on his own  Medical, Surgical, Family, and Social History    Past Medical History:   Diagnosis Date   • Hypertension      Past Surgical History:   Procedure Laterality Date   • HAND SURGERY       Social History   Social History     Substance and Sexual Activity   Alcohol Use Never     Social History     Substance and Sexual Activity   Drug Use Never     Social History     Tobacco Use   Smoking Status Never   Smokeless Tobacco Never     History reviewed  No pertinent family history    No Known Allergies       Physical Exam  /80   Pulse 80   Temp 97 8 °F (36 6 °C) (Temporal)   Ht 6' 2" (1 88 m)   Wt 104 kg (230 lb)   BMI 29 53 kg/m²     Constitutional:  see vital signs  Gen: well-developed, normocephalic/atraumatic, well-groomed  Pulmonary/Chest: Effort normal  No respiratory distress         Ortho Exam  Right Knee Exam:   Erythema: no  Swelling: no  Increased Warmth: no  Tenderness: none  ROM: 0-130  Knee flexion strength: 5/5  Knee extension strength: 5/5  Patellar Apprehension: negative, but palpable crepitus/pain over inferolateral aspect of patella  Patellar Grind: +  Lachman's: negative  Anterior Drawer: negative  Varus laxity: negative  Valgus laxity: negative  Jenny: negative   Thessaly Test: negative  Dial Test: negative            Procedures

## 2023-02-06 ENCOUNTER — OFFICE VISIT (OUTPATIENT)
Dept: PHYSICAL THERAPY | Facility: CLINIC | Age: 63
End: 2023-02-06

## 2023-02-06 DIAGNOSIS — M17.12 PRIMARY OSTEOARTHRITIS OF LEFT KNEE: ICD-10-CM

## 2023-02-06 DIAGNOSIS — M25.661 KNEE STIFFNESS, RIGHT: ICD-10-CM

## 2023-02-06 DIAGNOSIS — S89.91XA INJURY OF RIGHT KNEE, INITIAL ENCOUNTER: ICD-10-CM

## 2023-02-06 DIAGNOSIS — M25.561 CHRONIC PAIN OF RIGHT KNEE: Primary | ICD-10-CM

## 2023-02-06 DIAGNOSIS — G89.29 CHRONIC PAIN OF RIGHT KNEE: Primary | ICD-10-CM

## 2023-02-06 NOTE — PROGRESS NOTES
Daily Note     Today's date: 2023  Patient name: Damián Neal  : 1960  MRN: [id number]  Referring provider: Erma Moreno MD  Dx:   Encounter Diagnosis     ICD-10-CM    1  Chronic pain of right knee  M25 561     G89 29       2  Injury of right knee, initial encounter  S89  91XA       3  Knee stiffness, right  M25 661       4  Primary osteoarthritis of left knee  M17 12                      Subjective: Patient reports that both he and his dr are very pleased with his progress  Objective: See treatment diary below      Assessment: Tolerated treatment well  Patient demonstrated a good understanding of his HEP  Plan: Patient discharged to [facility] at this time  Precautions:     Manuals    PROM c mobz, quad stretching 10' 10' 10'  10' w/STM   Patellar Mobility 5' 5' 5'  5'           Neuro Re-Ed      Quad set supine Prone 10x :05 Prone 2x10 :05   2x10 [employer]  1' ea   NV   BOSU march  3'      BOSU SLR  10x bilat      BOSU squats  2x10              TherEx      [employer] [facility] 10'L5 [facility] 10'L5 [facility] 10' L5  [facility] 10'L5   LAQ/SAQ  LAQ 5# 2x10   LAQ 4# 2x10   SLR F/A/E        SL clamshells 2x10 BluTB 2x10 BluTB   2x10 GTB   Bridges 2x10 BluTB 2x10 BluTB   2x10 GTB   Leg press DL, SL 75# 2x10  55# 2x10 85# 2x10  55# 2x10   75# 2x10  55# 2x10   Gastroc/SoleusStretch on Step 3x15"ea 6"step 3x15"ea 6"step   3x15"ea 6"step   Lunges (Fwd/Lat/Retro)        [place] Walkout Fwd, Retro 15# 4 Laps Fwd, Retro 15# 4 Laps   Fwd, Retro 15# 3 Laps   Instructed HEP & education   20'BM                     Modalities                 Access Code: [email or web address]  URL: [email or web address]  Date: 2023  Prepared by: Kalani Tucker    Exercises  Supine Quad Set - 2 x daily - 7 x weekly - 2 sets - 10 reps  Supine Short Arc Quad - 2 x daily - 7 x weekly - 2 sets - 10 reps  Gastroc Stretch on Wall - 2 x daily - 7 x weekly - 1 sets - 5 reps - :15 hold

## 2023-02-06 NOTE — PROGRESS NOTES
PT Discharge    Today's date: 2023  Patient name: Ardena Siemens  : 1960  MRN: [id number]  Referring provider: Waldemar Damon MD  Dx:   Encounter Diagnosis     ICD-10-CM    1  Chronic pain of right knee  M25 561     G89 29       2  Injury of right knee, initial encounter  S89  91XA       3  Knee stiffness, right  M25 661       4  Primary osteoarthritis of left knee  M17 12                      Assessment  Assessment details: Pt has attended a total of 8 PT sessions and has made adequate progress towards goals to be d/c from PT services  PT reviewed and instructed HEP (handout provided) along with answering pt questions regarding current functional status  Pt has no concerns at time of discharge  Thank you! Goals  STG (to be met within 4 weeks):  1  Pt will improve R knee pain to no more than 2/10 at worst in order to improve gait quality  met  2  Pt will improve R knee ROM by at least 5 * in order to normalize gait pattern  met  3  Pt will improve quadricep contraction to good in order to improve stability in SL stance  met  4  Pt will improve R knee strength by at least 1/2 grade in order to negotiate curb step  met  6  Pt to improve FOTO score by at least 10 points in order to progress to PLOF  met    LTG (to bet met in 10 weeks):  1  Pt will be able to perform all activities without R knee pain in order to maximize independence  met  2  Pt will restore WFL R knee ROM in order to perform all functional activities  met  3  Pt will restore WFL R knee strength in order to return to hobbies  Met  4  Pt will be able to ascend/descend 1 flight of stairs with reciprocal gait pattern in order to return PLOF  met  5   Pt to meet FOTO discharge score in order to improve QOL and maximize independence   met            Subjective Evaluation    History of Present Illness  Mechanism of injury: Pt reports improvements in symptoms and feels prepared for d/c    Diagnostic Tests  X-ray: normal (normal degenerative changes)        Objective     Tenderness     Right Knee   No tenderness in the lateral joint line and medial joint line       Neurological Testing     Sensation     Knee   Left Knee   Intact: light touch    Right Knee   Intact: light touch     Reflexes   Left   Patellar (L4): normal (2+)  Achilles (S1): normal (2+)    Right   Patellar (L4): normal (2+)  Achilles (S1): normal (2+)    Active Range of Motion   Left Knee   Normal active range of motion    Right Knee   Flexion: 121 degrees   Extension: 6 degrees     Passive Range of Motion     Right Knee   Flexion: WFL  Extension: WFL    Strength/Myotome Testing     Left Knee   Flexion: 4+  Extension: 4+  Quadriceps contraction: good    Right Knee   Flexion: 4+  Extension: 4+  Quadriceps contraction: good             Precautions:

## 2023-02-08 ENCOUNTER — APPOINTMENT (OUTPATIENT)
Dept: PHYSICAL THERAPY | Facility: CLINIC | Age: 63
End: 2023-02-08

## 2024-08-03 ENCOUNTER — HOSPITAL ENCOUNTER (EMERGENCY)
Facility: HOSPITAL | Age: 64
Discharge: HOME | End: 2024-08-03
Attending: STUDENT IN AN ORGANIZED HEALTH CARE EDUCATION/TRAINING PROGRAM | Admitting: STUDENT IN AN ORGANIZED HEALTH CARE EDUCATION/TRAINING PROGRAM
Payer: MEDICARE

## 2024-08-03 ENCOUNTER — APPOINTMENT (EMERGENCY)
Dept: RADIOLOGY | Facility: HOSPITAL | Age: 64
End: 2024-08-03
Payer: MEDICARE

## 2024-08-03 VITALS
RESPIRATION RATE: 15 BRPM | BODY MASS INDEX: 31.14 KG/M2 | HEART RATE: 54 BPM | OXYGEN SATURATION: 97 % | WEIGHT: 242.51 LBS | DIASTOLIC BLOOD PRESSURE: 64 MMHG | TEMPERATURE: 98.7 F | SYSTOLIC BLOOD PRESSURE: 150 MMHG

## 2024-08-03 DIAGNOSIS — R07.89 ATYPICAL CHEST PAIN: Primary | ICD-10-CM

## 2024-08-03 DIAGNOSIS — M25.512 ACUTE PAIN OF LEFT SHOULDER: ICD-10-CM

## 2024-08-03 LAB
2HR DELTA HS TROPONIN: 1 NG/L
ALBUMIN SERPL BCG-MCNC: 4.7 G/DL (ref 3.5–5)
ALP SERPL-CCNC: 69 U/L (ref 34–104)
ALT SERPL W P-5'-P-CCNC: 33 U/L (ref 7–52)
ANION GAP SERPL CALCULATED.3IONS-SCNC: 10 MMOL/L (ref 4–13)
AST SERPL W P-5'-P-CCNC: 24 U/L (ref 13–39)
BASOPHILS # BLD AUTO: 0.03 THOUSANDS/ÂΜL (ref 0–0.1)
BASOPHILS NFR BLD AUTO: 0 % (ref 0–1)
BILIRUB SERPL-MCNC: 0.54 MG/DL (ref 0.2–1)
BUN SERPL-MCNC: 14 MG/DL (ref 5–25)
CALCIUM SERPL-MCNC: 9.9 MG/DL (ref 8.4–10.2)
CARDIAC TROPONIN I PNL SERPL HS: 8 NG/L
CARDIAC TROPONIN I PNL SERPL HS: 9 NG/L
CHLORIDE SERPL-SCNC: 101 MMOL/L (ref 96–108)
CO2 SERPL-SCNC: 26 MMOL/L (ref 21–32)
CREAT SERPL-MCNC: 0.85 MG/DL (ref 0.6–1.3)
EOSINOPHIL # BLD AUTO: 0.09 THOUSAND/ÂΜL (ref 0–0.61)
EOSINOPHIL NFR BLD AUTO: 1 % (ref 0–6)
ERYTHROCYTE [DISTWIDTH] IN BLOOD BY AUTOMATED COUNT: 11.9 % (ref 11.6–15.1)
GFR SERPL CREATININE-BSD FRML MDRD: 91 ML/MIN/1.73SQ M
GLUCOSE SERPL-MCNC: 105 MG/DL (ref 65–140)
HCT VFR BLD AUTO: 45.4 % (ref 36.5–49.3)
HGB BLD-MCNC: 15.4 G/DL (ref 12–17)
IMM GRANULOCYTES # BLD AUTO: 0.02 THOUSAND/UL (ref 0–0.2)
IMM GRANULOCYTES NFR BLD AUTO: 0 % (ref 0–2)
LYMPHOCYTES # BLD AUTO: 2.74 THOUSANDS/ÂΜL (ref 0.6–4.47)
LYMPHOCYTES NFR BLD AUTO: 35 % (ref 14–44)
MAGNESIUM SERPL-MCNC: 2 MG/DL (ref 1.9–2.7)
MCH RBC QN AUTO: 29.4 PG (ref 26.8–34.3)
MCHC RBC AUTO-ENTMCNC: 33.9 G/DL (ref 31.4–37.4)
MCV RBC AUTO: 87 FL (ref 82–98)
MONOCYTES # BLD AUTO: 0.95 THOUSAND/ÂΜL (ref 0.17–1.22)
MONOCYTES NFR BLD AUTO: 12 % (ref 4–12)
NEUTROPHILS # BLD AUTO: 4.08 THOUSANDS/ÂΜL (ref 1.85–7.62)
NEUTS SEG NFR BLD AUTO: 52 % (ref 43–75)
NRBC BLD AUTO-RTO: 0 /100 WBCS
PLATELET # BLD AUTO: 217 THOUSANDS/UL (ref 149–390)
PMV BLD AUTO: 10.3 FL (ref 8.9–12.7)
POTASSIUM SERPL-SCNC: 3.9 MMOL/L (ref 3.5–5.3)
PROT SERPL-MCNC: 7.8 G/DL (ref 6.4–8.4)
RBC # BLD AUTO: 5.23 MILLION/UL (ref 3.88–5.62)
SODIUM SERPL-SCNC: 137 MMOL/L (ref 135–147)
WBC # BLD AUTO: 7.91 THOUSAND/UL (ref 4.31–10.16)

## 2024-08-03 PROCEDURE — 96374 THER/PROPH/DIAG INJ IV PUSH: CPT

## 2024-08-03 PROCEDURE — 83735 ASSAY OF MAGNESIUM: CPT | Performed by: PHYSICIAN ASSISTANT

## 2024-08-03 PROCEDURE — 99285 EMERGENCY DEPT VISIT HI MDM: CPT

## 2024-08-03 PROCEDURE — 71045 X-RAY EXAM CHEST 1 VIEW: CPT

## 2024-08-03 PROCEDURE — 36415 COLL VENOUS BLD VENIPUNCTURE: CPT | Performed by: PHYSICIAN ASSISTANT

## 2024-08-03 PROCEDURE — 85025 COMPLETE CBC W/AUTO DIFF WBC: CPT | Performed by: PHYSICIAN ASSISTANT

## 2024-08-03 PROCEDURE — 84484 ASSAY OF TROPONIN QUANT: CPT | Performed by: PHYSICIAN ASSISTANT

## 2024-08-03 PROCEDURE — 80053 COMPREHEN METABOLIC PANEL: CPT | Performed by: PHYSICIAN ASSISTANT

## 2024-08-03 PROCEDURE — 96361 HYDRATE IV INFUSION ADD-ON: CPT

## 2024-08-03 PROCEDURE — 93005 ELECTROCARDIOGRAM TRACING: CPT

## 2024-08-03 PROCEDURE — 99285 EMERGENCY DEPT VISIT HI MDM: CPT | Performed by: PHYSICIAN ASSISTANT

## 2024-08-03 RX ORDER — ACETAMINOPHEN 325 MG/1
975 TABLET ORAL ONCE
Status: COMPLETED | OUTPATIENT
Start: 2024-08-03 | End: 2024-08-03

## 2024-08-03 RX ORDER — ASPIRIN 81 MG/1
253 TABLET, CHEWABLE ORAL ONCE
Status: COMPLETED | OUTPATIENT
Start: 2024-08-03 | End: 2024-08-03

## 2024-08-03 RX ORDER — METOPROLOL SUCCINATE 50 MG/1
50 TABLET, EXTENDED RELEASE ORAL DAILY
COMMUNITY

## 2024-08-03 RX ORDER — NITROGLYCERIN 0.4 MG/1
0.4 TABLET SUBLINGUAL ONCE
Status: COMPLETED | OUTPATIENT
Start: 2024-08-03 | End: 2024-08-03

## 2024-08-03 RX ORDER — MORPHINE SULFATE 4 MG/ML
4 INJECTION, SOLUTION INTRAMUSCULAR; INTRAVENOUS ONCE
Status: COMPLETED | OUTPATIENT
Start: 2024-08-03 | End: 2024-08-03

## 2024-08-03 RX ADMIN — NITROGLYCERIN 0.4 MG: 0.4 TABLET, ORALLY DISINTEGRATING SUBLINGUAL at 13:06

## 2024-08-03 RX ADMIN — ACETAMINOPHEN 325MG 975 MG: 325 TABLET ORAL at 14:08

## 2024-08-03 RX ADMIN — ASPIRIN 81 MG 243 MG: 81 TABLET ORAL at 13:06

## 2024-08-03 RX ADMIN — MORPHINE SULFATE 4 MG: 4 INJECTION INTRAVENOUS at 14:09

## 2024-08-03 RX ADMIN — SODIUM CHLORIDE 500 ML: 0.9 INJECTION, SOLUTION INTRAVENOUS at 13:06

## 2024-08-03 NOTE — ED PROVIDER NOTES
History  Chief Complaint   Patient presents with    Chest Pain     Patient presents to the ED with complaints of chest pain and tingling to the left arm that began today.      The patient is a 64-year-old male with past medical history of hypertension dyslipidemia who presents with a chief complaint of left-sided chest pain occurred at 11:30 AM today.  He states that he was standing inside at work.  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  States this was not strenuous work.  He states that the pain is left-sided chest radiates down the arm.  States it is a tightness.  States it is a 7 out of 10 sensation.  Has not noticed anything that makes the pain worse or better.  He does take a daily 81 mg aspirin as well as hypertension medication.  Denies any recent travel or falls any recent illnesses fevers chills cough congestion nausea vomiting dizziness.  He states that he has been at home going inside project building a deck.  He states that he has had twinges of left-sided chest pain that he assumed were muscle spasms from building a deck.  Pain has been constant and has not improved or worsened.          Prior to Admission Medications   Prescriptions Last Dose Informant Patient Reported? Taking?   amLODIPine (NORVASC) 5 mg tablet Not Taking  Yes No   Si mg in the morning   Patient not taking: Reported on 8/3/2024   metoprolol succinate (TOPROL-XL) 50 mg 24 hr tablet   Yes Yes   Sig: Take 50 mg by mouth daily      Facility-Administered Medications: None       Past Medical History:   Diagnosis Date    Hypertension        Past Surgical History:   Procedure Laterality Date    HAND SURGERY         History reviewed. No pertinent family history.  I have reviewed and agree with the history as documented.    E-Cigarette/Vaping    E-Cigarette Use Never User      E-Cigarette/Vaping Substances     Social History     Tobacco Use    Smoking status: Never    Smokeless tobacco: Never   Vaping Use     Vaping status: Never Used   Substance Use Topics    Alcohol use: Never    Drug use: Never       Review of Systems   All other systems reviewed and are negative.      Physical Exam  Physical Exam  Vitals and nursing note reviewed.   Constitutional:       General: He is in acute distress.      Appearance: He is well-developed.   HENT:      Head: Normocephalic and atraumatic.      Mouth/Throat:      Mouth: Oropharynx is clear and moist.   Eyes:      Extraocular Movements: EOM normal.      Pupils: Pupils are equal, round, and reactive to light.   Cardiovascular:      Rate and Rhythm: Normal rate and regular rhythm.      Heart sounds: No murmur heard.  Pulmonary:      Effort: Pulmonary effort is normal. No respiratory distress.      Breath sounds: Normal breath sounds.   Chest:      Chest wall: No tenderness.   Abdominal:      General: Bowel sounds are normal.      Palpations: Abdomen is soft.      Tenderness: There is no abdominal tenderness.   Musculoskeletal:      Cervical back: Normal range of motion.      Right lower leg: No edema.      Left lower leg: No edema.   Skin:     General: Skin is warm and dry.      Capillary Refill: Capillary refill takes less than 2 seconds.   Neurological:      General: No focal deficit present.      Mental Status: He is alert and oriented to person, place, and time.   Psychiatric:         Mood and Affect: Mood and affect normal.         Behavior: Behavior normal.         Vital Signs  ED Triage Vitals [08/03/24 1252]   Temperature Pulse Respirations Blood Pressure SpO2   98.7 °F (37.1 °C) 79 18 120/93 98 %      Temp Source Heart Rate Source Patient Position - Orthostatic VS BP Location FiO2 (%)   Temporal Monitor Lying Left arm --      Pain Score       6           Vitals:    08/03/24 1400 08/03/24 1430 08/03/24 1500 08/03/24 1545   BP: 154/67 163/67 162/69 150/64   Pulse: 64 (!) 54 (!) 53 (!) 54   Patient Position - Orthostatic VS: Lying Lying Lying Lying         Visual Acuity      ED  Medications  Medications   aspirin chewable tablet 243 mg (243 mg Oral Given 8/3/24 1306)   sodium chloride 0.9 % bolus 500 mL (0 mL Intravenous Stopped 8/3/24 1410)   nitroglycerin (NITROSTAT) SL tablet 0.4 mg (0.4 mg Sublingual Given 8/3/24 1306)   morphine injection 4 mg (4 mg Intravenous Given 8/3/24 1409)   acetaminophen (TYLENOL) tablet 975 mg (975 mg Oral Given 8/3/24 1408)       Diagnostic Studies  Results Reviewed       Procedure Component Value Units Date/Time    HS Troponin I 2hr [[id number]]  (Normal) Collected: 08/03/24 1517    Lab Status: Final result Specimen: Blood from Arm, Right Updated: 08/03/24 1543     hs TnI 2hr 9 ng/L      Delta 2hr hsTnI 1 ng/L     HS Troponin 0hr (reflex protocol) [[id number]]  (Normal) Collected: 08/03/24 1309    Lab Status: Final result Specimen: Blood from Arm, Right Updated: 08/03/24 1336     hs TnI 0hr 8 ng/L     Comprehensive metabolic panel [[id number]] Collected: 08/03/24 1309    Lab Status: Final result Specimen: Blood from Arm, Right Updated: 08/03/24 1328     Sodium 137 mmol/L      Potassium 3.9 mmol/L      Chloride 101 mmol/L      CO2 26 mmol/L      ANION GAP 10 mmol/L      BUN 14 mg/dL      Creatinine 0.85 mg/dL      Glucose 105 mg/dL      Calcium 9.9 mg/dL      AST 24 U/L      ALT 33 U/L      Alkaline Phosphatase 69 U/L      Total Protein 7.8 g/dL      Albumin 4.7 g/dL      Total Bilirubin 0.54 mg/dL      eGFR 91 ml/min/1.73sq m     Narrative:      National Kidney Disease Foundation guidelines for Chronic Kidney Disease (CKD):     Stage 1 with normal or high GFR (GFR > 90 mL/min/1.73 square meters)    Stage 2 Mild CKD (GFR = 60-89 mL/min/1.73 square meters)    Stage 3A Moderate CKD (GFR = 45-59 mL/min/1.73 square meters)    Stage 3B Moderate CKD (GFR = 30-44 mL/min/1.73 square meters)    Stage 4 Severe CKD (GFR = 15-29 mL/min/1.73 square meters)    Stage 5 End Stage CKD (GFR <15 mL/min/1.73 square meters)  Note: GFR calculation is accurate only with a steady state  creatinine    Magnesium [[id number]]  (Normal) Collected: 08/03/24 1309    Lab Status: Final result Specimen: Blood from Arm, Right Updated: 08/03/24 1328     Magnesium 2.0 mg/dL     CBC and differential [[id number]] Collected: 08/03/24 1309    Lab Status: Final result Specimen: Blood from Arm, Right Updated: 08/03/24 1314     WBC 7.91 Thousand/uL      RBC 5.23 Million/uL      Hemoglobin 15.4 g/dL      Hematocrit 45.4 %      MCV 87 fL      MCH 29.4 pg      MCHC 33.9 g/dL      RDW 11.9 %      MPV 10.3 fL      Platelets 217 Thousands/uL      nRBC 0 /100 WBCs      Segmented % 52 %      Immature Grans % 0 %      Lymphocytes % 35 %      Monocytes % 12 %      Eosinophils Relative 1 %      Basophils Relative 0 %      Absolute Neutrophils 4.08 Thousands/µL      Absolute Immature Grans 0.02 Thousand/uL      Absolute Lymphocytes 2.74 Thousands/µL      Absolute Monocytes 0.95 Thousand/µL      Eosinophils Absolute 0.09 Thousand/µL      Basophils Absolute 0.03 Thousands/µL                    XR chest 1 view portable   ED Interpretation by Markell Delgado PA-C (08/03 1537)   NAD                 Procedures  ECG 12 Lead Documentation Only    Date/Time: 8/3/2024 4:34 PM    Performed by: Markell Delgado PA-C  Authorized by: Markell Delgado PA-C    Indications / Diagnosis:  Cp  Patient location:  ED  Previous ECG:     Previous ECG:  Unavailable  Interpretation:     Interpretation: non-specific    Rate:     ECG rate:  79  Rhythm:     Rhythm: sinus rhythm    Ectopy:     Ectopy: PVCs             ED Course  ED Course as of 08/03/24 1637   Sat Aug 03, 2024   1357 hs TnI 0hr: 8   1424 Pending improved to a 4 out of 10 and patient had a headache from the [place]               HEART Risk Score      Flowsheet Row Most Recent Value   Heart Score Risk Calculator    History 1 Filed at: 08/03/2024 1635   ECG 0 Filed at: 08/03/2024 1635   Age 2 Filed at: 08/03/2024 1635   Risk Factors 1 Filed at: 08/03/2024 1635   Troponin 0 Filed at:  08/03/2024 1635   HEART Score 4 Filed at: 08/03/2024 1635                          SBIRT 20yo+      Flowsheet Row Most Recent Value   Initial Alcohol Screen: US AUDIT-C     1. How often do you have a drink containing alcohol? 0 Filed at: 08/03/2024 1252   2. How many drinks containing alcohol do you have on a typical day you are drinking?  0 Filed at: 08/03/2024 1252   3a. Male UNDER 65: How often do you have five or more drinks on one occasion? 0 Filed at: 08/03/2024 1252   3b. FEMALE Any Age, or MALE 65+: How often do you have 4 or more drinks on one occassion? 0 Filed at: 08/03/2024 1252   Audit-C Score 0 Filed at: 08/03/2024 1252   JAYASHREE: How many times in the past year have you...    Used an illegal drug or used a prescription medication for non-medical reasons? Never Filed at: 08/03/2024 1252                      Medical Decision Making  The patient is a 64-year-old male with past medical history of hypertension dyslipidemia who presents with a chief complaint of left-sided chest pain occurred at 11:30 AM today.  He states that he was standing inside at work.  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  States this was not strenuous work.  He states that the pain is left-sided chest radiates down the arm.  States it is a tightness.  States it is a 7 out of 10 sensation.  Has not noticed anything that makes the pain worse or better.  He does take a daily 81 mg aspirin as well as hypertension medication.  Denies any recent travel or falls any recent illnesses fevers chills cough congestion nausea vomiting dizziness.  He states that he has been at home going inside project building a deck.  He states that he has had twinges of left-sided chest pain that he assumed were muscle spasms from building a deck.  Pain has been constant and has not improved or worsened.    Patient did not have much improvement with the nitro but had complete treatment with the morphine.  Patient did not like the  side effects of the morphine.  Patient did develop a headache from the nitro.  Patient states that he still felt tightness in the left shoulder and was worse with certain positions.  He has been very busy at home doing side products so he feels as though this might be related to his side products.  Patient did not feel short of breath.  Troponin x 2 obtained.  The patient had pain resolution and was monitored in the emergency department.  Lung sounds clear.  Patient had a previous stress test when he was in his 30s and had no acute findings.  At this time it was discussed with the patient to have follow-up with cardiology    Patient is not following with cardiology at this time.  The patient not have any unstable angina symptoms.  Patient is a non-smoker.  At this time patient was discharged home but instructed to return if anything were to change or worsen.  Patient was instructed to abstain from any strenuous activity at home or any of the sized products until he follows up with cardiology.  He expressed understanding wife at bedside also in agreement with treatment plan.    Due to the pain being in his left shoulder and worse with certain positions it was felt that the patient's pain could be most likely musculoskeletal but again with his risk factors and age did place urgent referral to cardiology.    Amount and/or Complexity of Data Reviewed  Labs: ordered. Decision-making details documented in ED Course.  Radiology: ordered and independent interpretation performed. Decision-making details documented in ED Course.  ECG/medicine tests: ordered and independent interpretation performed. Decision-making details documented in ED Course.    Risk  OTC drugs.  Prescription drug management.                 Disposition  Final diagnoses:   Atypical chest pain   Acute pain of left shoulder     Time reflects when diagnosis was documented in both MDM as applicable and the Disposition within this note       Time User Action  Codes Description Comment    8/3/2024  4:11 PM Markell Delgado [R07.89] Atypical chest pain     8/3/2024  4:11 PM Markell Delgado [M25.512] Acute pain of left shoulder           ED Disposition       ED Disposition   Discharge    Condition   Stable    Date/Time   Sat Aug 3, 2024 1611    Comment   Maia TORRES Crystal discharge to home/self care.                   Follow-up Information       Follow up With Specialties Details Why Contact Info Additional Information    [facility] Cardiology [facility] Schedule an appointment as soon as possible for a visit   [address]  [address] [facility] [address] [phone] [facility],[address] [place] [address] [address], [address], [place], [address] [phone]            Discharge Medication List as of 8/3/2024  4:12 PM        CONTINUE these medications which have NOT CHANGED    Details   metoprolol succinate (TOPROL-XL) 50 mg 24 hr tablet Take 50 mg by mouth daily, Historical Med      amLODIPine (NORVASC) 5 mg tablet 5 mg in the morning, Starting Mon 1/2/2023, Historical Med                 PDMP Review       None            ED Provider  Electronically Signed by             Markell Delgado PA-C  08/03/24 1805

## 2024-08-05 LAB
ATRIAL RATE: 79 BPM
P AXIS: 15 DEGREES
PR INTERVAL: 164 MS
QRS AXIS: -30 DEGREES
QRSD INTERVAL: 102 MS
QT INTERVAL: 380 MS
QTC INTERVAL: 435 MS
T WAVE AXIS: 36 DEGREES
VENTRICULAR RATE: 79 BPM

## 2024-09-16 ENCOUNTER — CONSULT (OUTPATIENT)
Dept: CARDIOLOGY CLINIC | Facility: CLINIC | Age: 64
End: 2024-09-16
Payer: MEDICARE

## 2024-09-16 VITALS
WEIGHT: 236 LBS | OXYGEN SATURATION: 98 % | HEART RATE: 50 BPM | HEIGHT: 74 IN | SYSTOLIC BLOOD PRESSURE: 222 MMHG | DIASTOLIC BLOOD PRESSURE: 110 MMHG | BODY MASS INDEX: 30.29 KG/M2

## 2024-09-16 DIAGNOSIS — I49.3 PVC (PREMATURE VENTRICULAR CONTRACTION): ICD-10-CM

## 2024-09-16 DIAGNOSIS — I10 PRIMARY HYPERTENSION: ICD-10-CM

## 2024-09-16 DIAGNOSIS — R06.09 DOE (DYSPNEA ON EXERTION): ICD-10-CM

## 2024-09-16 DIAGNOSIS — R07.89 OTHER CHEST PAIN: Primary | ICD-10-CM

## 2024-09-16 PROCEDURE — 99204 OFFICE O/P NEW MOD 45 MIN: CPT | Performed by: INTERNAL MEDICINE

## 2024-09-16 RX ORDER — LOSARTAN POTASSIUM 50 MG/1
50 TABLET ORAL DAILY
Qty: 30 TABLET | Refills: 0 | Status: SHIPPED | OUTPATIENT
Start: 2024-09-16

## 2024-09-16 RX ORDER — LOSARTAN POTASSIUM 50 MG/1
50 TABLET ORAL DAILY
Qty: 90 TABLET | Refills: 3 | Status: SHIPPED | OUTPATIENT
Start: 2024-09-16 | End: 2024-09-16

## 2024-09-16 NOTE — ASSESSMENT & PLAN NOTE
Even though his chest pain symptoms rather atypical, he does have multiple risk factors for coronary artery disease and hence we will proceed with a treadmill stress test for further evaluation.  We will also check the most recent lipid profile as he admits to mildly elevated cholesterol in the past.

## 2024-09-16 NOTE — ASSESSMENT & PLAN NOTE
Patient does admit to very mild dyspnea on moderate exertion.  We do not have any recent echocardiogram on record.  We will schedule one to assess his LV function as well as presence or absence of LVH which would indicate how well his blood pressure has been controlled over the last several years.

## 2024-09-16 NOTE — ASSESSMENT & PLAN NOTE
Recent ECG during ER visit showed PVCs and patient was noted to have asymptomatic PVCs during this visit as well.  We will continue metoprolol for now.

## 2024-09-16 NOTE — ASSESSMENT & PLAN NOTE
Blood pressure was double checked by me and it was 180/100 down from his arrival pressure.  He admits to not regularly checking his blood pressure at home.  He remains completely asymptomatic.  I would recommend starting losartan 50 mg daily and continuing the metoprolol for now.  I have also advised him to keep a regular home blood pressure diary and to call the office in 2 weeks with the numbers so we may can make further adjustments.

## 2024-09-16 NOTE — PROGRESS NOTES
[facility] [place]  [address]  [address]  [facility] [id number] [phone]      Patient name: Maia Crystal   YOB: 1960   MR no: [id number]        Diagnosis ICD-10-CM Associated Orders   1. Other chest pain  R07.89 Ambulatory Referral to Cardiology     Stress test only, exercise     Echo complete w/ contrast if indicated     Lipid panel     losartan (COZAAR) 50 mg tablet      2. Primary hypertension  I10 Stress test only, exercise     Lipid panel      3. HARTMANN (dyspnea on exertion)  R06.09 Echo complete w/ contrast if indicated     losartan (COZAAR) 50 mg tablet      4. PVC (premature ventricular contraction)  I49.3            Assessment and Recommendations:    1. Other chest pain  Assessment & Plan:  Even though his chest pain symptoms rather atypical, he does have multiple risk factors for coronary artery disease and hence we will proceed with a treadmill stress test for further evaluation.  We will also check the most recent lipid profile as he admits to mildly elevated cholesterol in the past.  Orders:  -     Ambulatory Referral to Cardiology  -     Stress test only, exercise; Future; Expected date: 09/16/2024  -     Echo complete w/ contrast if indicated; Future; Expected date: 09/16/2024  -     Lipid panel; Future  -     losartan (COZAAR) 50 mg tablet; Take 1 tablet (50 mg total) by mouth daily  2. Primary hypertension  Assessment & Plan:  Blood pressure was double checked by me and it was 180/100 down from his arrival pressure.  He admits to not drinking regularly checking his blood pressure at home.  He remains completely asymptomatic.  I would recommend starting losartan 50 mg daily and continuing the metoprolol for now.  I have also advised him to keep a regular home blood pressure diary and to call the office in 2 weeks with the numbers so we may can make further adjustments.  Orders:  -     Stress test only, exercise; Future;  Expected date: 09/16/2024  -     Lipid panel; Future  3. HARTMANN (dyspnea on exertion)  Assessment & Plan:  Patient does admit to very mild dyspnea on moderate exertion.  We do not have any recent echocardiogram on record.  We will schedule 1 to assess his LV function as well as presence or absence of LVH which would indicate how well his blood pressure has been controlled over the last several years.  Orders:  -     Echo complete w/ contrast if indicated; Future; Expected date: 09/16/2024  -     losartan (COZAAR) 50 mg tablet; Take 1 tablet (50 mg total) by mouth daily  4. PVC (premature ventricular contraction)  Assessment & Plan:  Recent ECG during ER visit showed PVCs and patient was noted to have asymptomatic PVCs during this visit as well.  We will continue metoprolol for now.     I will be in touch with the patient through [employer] once have reviewed his stress test and echocardiogram report.  He will return for a follow-up in 8 weeks.    CHIEF COMPLAINT:  Chest pain    HPI:  64-year-old male with past medical history significant for hypertension and borderline hyperlipidemia, presents for his first cardiology visit.  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  Symptoms lasted for several hours.  There was no associated shortness of breath, palpitations or diaphoresis.  He did not have any relief with sublingual nitroglycerin in the ER which gave him a headache but did get relief with intravenous morphine.  He does not report any subsequent similar symptoms but still occasionally gets left shoulder pain.  He does admit to working in restoring his home deck prior to these symptoms.  He is quite active at work but does not do any regular exercise.  He denies any palpitations or syncope.    Past Medical History:   Diagnosis Date    Hypertension         Past Surgical History:   Procedure Laterality Date  "   HAND SURGERY          Social History     Tobacco Use    Smoking status: Never    Smokeless tobacco: Never   Vaping Use    Vaping status: Never Used   Substance Use Topics    Alcohol use: Never    Drug use: Never       History reviewed. No pertinent family history.     No Known Allergies      Current Outpatient Medications:     losartan (COZAAR) 50 mg tablet, Take 1 tablet (50 mg total) by mouth daily, Disp: 30 tablet, Rfl: 0    metoprolol succinate (TOPROL-XL) 50 mg 24 hr tablet, Take 50 mg by mouth daily, Disp: , Rfl:      Lab Results   Component Value Date    CREATININE 0.85 08/03/2024    K 3.9 08/03/2024    SODIUM 137 08/03/2024       I have personally reviewed the ECG from August 3, 2024 which showed normal sinus rhythm with frequent PVCs and LVH.    REVIEW OF SYSTEMS   Positive for: Left shoulder and left chest pain, mild dyspnea on uphill exertion.  Negative for: All remaining as reviewed below and in HPI.    SYSTEM SYMPTOMS REVIEWED:  General--weight change, fever, night sweats  Respiratory--cough, wheezing, shortness of breath, sputum production  Cardiovascular--chest pain, syncope, dyspnea on exertion, edema, decline in exercise tolerance, claudication   Gastrointestinal--persistent vomiting, diarrhea, abdominal distention, blood in stool   Muscular or skeletal--joint pain or swelling   Neurologic--headaches, syncope, abnormal movement  Hematologic--history of easy bruising and bleeding   Endocrine--thyroid enlargement, heat or cold intolerance, polyuria   Psychiatric--anxiety, depression     Vitals:    09/16/24 0830   BP: (!) 222/110   Pulse: (!) 50   SpO2: 98%   Weight: 107 kg (236 lb)   Height: 6' 2\" (1.88 m)       Wt Readings from Last 3 Encounters:   09/16/24 107 kg (236 lb)   08/03/24 110 kg (242 lb 8.1 oz)   02/02/23 104 kg (230 lb)        Body mass index is 30.3 kg/m².     General physical examination:    General appearance: Alert, no acute distress, appears stated age, mildly overweight.  HEENT: " "Mucous membranes are moist.  No obvious abnormality noted.  Neck: Supple with no lymphadenopathy.  No JVD.  Carotid pulses are intact.  No carotid bruit.  Cardiovascular system: Regular rhythm.  Normal S1 and S2.  No murmurs.  No rubs or gallops. Extremities: No edema. No cyanosis.  Pulmonary: Respirations unlabored.  Good air entry bilaterally.  Clear to auscultation bilaterally.  Gastrointestinal: Abdomen is soft and nontender.  Bowel sounds are positive.  Musculoskeletal: Upper Extremities: Normal upper motor strength. Lower Extremity: Normal motor strength. Gait: Normal.   Skin: Skin is warm. No rashes or lesions.  Neurological: Patient is alert and oriented with no gross motor deficits.  Psychiatric: Mood is normal.  Behavior is normal.        Thank you for allowing me to be a part of this patient's care. If you have further questions, please feel free to contact me.    Chris Gomez MD, FACC, KIRIT    Portions of the record  have been created with voice recognition software.  Occasional grammatical mistakes or wrong word or \"sound alike\" substitutions may have occurred due to the inherent limitations of voice recognition software. Please reach out to me directly for any clarifications.     "

## 2024-09-23 ENCOUNTER — HOSPITAL ENCOUNTER (OUTPATIENT)
Dept: NON INVASIVE DIAGNOSTICS | Facility: HOSPITAL | Age: 64
Discharge: HOME | End: 2024-09-23
Attending: INTERNAL MEDICINE
Payer: MEDICARE

## 2024-09-23 VITALS
WEIGHT: 236 LBS | DIASTOLIC BLOOD PRESSURE: 110 MMHG | SYSTOLIC BLOOD PRESSURE: 222 MMHG | HEIGHT: 74 IN | HEART RATE: 80 BPM | BODY MASS INDEX: 30.29 KG/M2

## 2024-09-23 DIAGNOSIS — I10 PRIMARY HYPERTENSION: ICD-10-CM

## 2024-09-23 DIAGNOSIS — R07.89 OTHER CHEST PAIN: ICD-10-CM

## 2024-09-23 DIAGNOSIS — R06.09 DOE (DYSPNEA ON EXERTION): ICD-10-CM

## 2024-09-23 LAB
AORTIC ROOT: 3.5 CM
APICAL FOUR CHAMBER EJECTION FRACTION: 54 %
ASCENDING AORTA: 3.6 CM
BSA FOR ECHO PROCEDURE: 2.33 M2
E WAVE DECELERATION TIME: 255 MS
E/A RATIO: 0.66
FRACTIONAL SHORTENING: 34 (ref 28–44)
INTERVENTRICULAR SEPTUM IN DIASTOLE (PARASTERNAL SHORT AXIS VIEW): 1.5 CM
INTERVENTRICULAR SEPTUM: 1.5 CM (ref 0.6–1.1)
LAAS-AP2: 28.4 CM2
LAAS-AP4: 20.6 CM2
LEFT ATRIUM SIZE: 4.7 CM
LEFT ATRIUM VOLUME (MOD BIPLANE): 83 ML
LEFT ATRIUM VOLUME INDEX (MOD BIPLANE): 35.6 ML/M2
LEFT INTERNAL DIMENSION IN SYSTOLE: 3.9 CM (ref 2.1–4)
LEFT VENTRICULAR INTERNAL DIMENSION IN DIASTOLE: 5.9 CM (ref 3.5–6)
LEFT VENTRICULAR POSTERIOR WALL IN END DIASTOLE: 1.4 CM
LEFT VENTRICULAR STROKE VOLUME: 103 ML
LVSV (TEICH): 103 ML
MAX HR PERCENT: 77 %
MAX HR: 120 BPM
MV E'TISSUE VEL-SEP: 6 CM/S
MV PEAK A VEL: 0.82 M/S
MV PEAK E VEL: 54 CM/S
MV STENOSIS PRESSURE HALF TIME: 74 MS
MV VALVE AREA P 1/2 METHOD: 2.97
RATE PRESSURE PRODUCT: NORMAL
RIGHT ATRIUM AREA SYSTOLE A4C: 14.4 CM2
RIGHT VENTRICLE ID DIMENSION: 3.2 CM
SL CV LEFT ATRIUM LENGTH A2C: 6.3 CM
SL CV PED ECHO LEFT VENTRICLE DIASTOLIC VOLUME (MOD BIPLANE) 2D: 170 ML
SL CV PED ECHO LEFT VENTRICLE SYSTOLIC VOLUME (MOD BIPLANE) 2D: 67 ML
SL CV STRESS RECOVERY BP: NORMAL MMHG
SL CV STRESS RECOVERY HR: 75 BPM
SL CV STRESS RECOVERY O2 SAT: 98 %
SL CV STRESS STAGE REACHED: 3
STRESS ANGINA INDEX: 0
STRESS BASELINE BP: NORMAL MMHG
STRESS BASELINE HR: 67 BPM
STRESS O2 SAT REST: 67 %
STRESS PEAK HR: 120 BPM
STRESS POST ESTIMATED WORKLOAD: 10.1 METS
STRESS POST EXERCISE DUR MIN: 8 MIN
STRESS POST EXERCISE DUR SEC: 28 SEC
STRESS POST O2 SAT PEAK: 98 %
STRESS POST PEAK BP: 220 MMHG
TRICUSPID ANNULAR PLANE SYSTOLIC EXCURSION: 2.1 CM

## 2024-09-23 PROCEDURE — 93306 TTE W/DOPPLER COMPLETE: CPT

## 2024-09-23 PROCEDURE — 93306 TTE W/DOPPLER COMPLETE: CPT | Performed by: INTERNAL MEDICINE

## 2024-09-23 PROCEDURE — 93016 CV STRESS TEST SUPVJ ONLY: CPT | Performed by: INTERNAL MEDICINE

## 2024-09-23 PROCEDURE — 93017 CV STRESS TEST TRACING ONLY: CPT

## 2024-09-23 PROCEDURE — 93018 CV STRESS TEST I&R ONLY: CPT | Performed by: INTERNAL MEDICINE

## 2024-09-23 RX ORDER — ASPIRIN 81 MG/1
81 TABLET, CHEWABLE ORAL DAILY
COMMUNITY

## 2024-10-08 ENCOUNTER — TELEPHONE (OUTPATIENT)
Dept: CARDIOLOGY CLINIC | Facility: CLINIC | Age: 64
End: 2024-10-08

## 2024-10-08 NOTE — TELEPHONE ENCOUNTER
Please ask Sonal to draft a simple note that states that the patient is being seen in cardiology for severe hypertension, chest pain and dyspnea on exertion and is undergoing cardiac evaluation and medication optimization and has an important follow-up visit on October 31 and that he may be excused from jury duty for that.

## 2024-10-08 NOTE — TELEPHONE ENCOUNTER
Caller: Maia    Doctor: Patricia    Reason for call: Patient calling to let us know he got summoned for Jury duty on the date of his appointment October 31. He is wondering if Dr. Gomez would be willing to write him a note to excuse him from jury duty so he can attend his appointment. Patient is requesting a detailed message be left on his mobile phone number if he does not answer.    Call back#: [phone]     Finasteride Pregnancy And Lactation Text: This medication is absolutely contraindicated during pregnancy. It is unknown if it is excreted in breast milk.

## 2024-10-31 ENCOUNTER — OFFICE VISIT (OUTPATIENT)
Dept: CARDIOLOGY CLINIC | Facility: CLINIC | Age: 64
End: 2024-10-31
Payer: MEDICARE

## 2024-10-31 VITALS
DIASTOLIC BLOOD PRESSURE: 100 MMHG | OXYGEN SATURATION: 98 % | BODY MASS INDEX: 30.93 KG/M2 | HEART RATE: 88 BPM | WEIGHT: 241 LBS | SYSTOLIC BLOOD PRESSURE: 192 MMHG | HEIGHT: 74 IN

## 2024-10-31 DIAGNOSIS — I10 PRIMARY HYPERTENSION: Primary | ICD-10-CM

## 2024-10-31 DIAGNOSIS — I49.3 PVC (PREMATURE VENTRICULAR CONTRACTION): ICD-10-CM

## 2024-10-31 DIAGNOSIS — R06.09 DOE (DYSPNEA ON EXERTION): ICD-10-CM

## 2024-10-31 PROCEDURE — 99214 OFFICE O/P EST MOD 30 MIN: CPT | Performed by: INTERNAL MEDICINE

## 2024-10-31 PROCEDURE — G2211 COMPLEX E/M VISIT ADD ON: HCPCS | Performed by: INTERNAL MEDICINE

## 2024-10-31 RX ORDER — AMLODIPINE AND BENAZEPRIL HYDROCHLORIDE 10; 20 MG/1; MG/1
1 CAPSULE ORAL DAILY
Qty: 30 CAPSULE | Refills: 0 | Status: SHIPPED | OUTPATIENT
Start: 2024-10-31

## 2024-10-31 NOTE — PROGRESS NOTES
[facility] [place]  [address]  [address]  [facility] [id number] [phone]    Patient Name: Maia Crystal  YOB: 1960 ;male  MR No: [id number]        Diagnosis ICD-10-CM Associated Orders   1. Primary hypertension  I10 amLODIPine-benazepril (LOTREL) 10-20 MG per capsule      2. HARTMANN (dyspnea on exertion)  R06.09       3. PVC (premature ventricular contraction)  I49.3            Assessment and recommendations:    1. Primary hypertension  Assessment & Plan:  Blood pressure remains suboptimally controlled.  His home blood pressure log from September all the way to end of October show systolics ranging 150-170 and diastolic in the 75-85 range with heart rate in the 60s.  He tells me that a few days after starting the losartan 50 mg he had several episodes of feeling profound weakness and fatigue.  He did not take his blood pressure during these episodes.  We will change losartan to amlodipine benazepril combination and see if he tolerates that better.  I have also advised him to take his metoprolol and the Lotrel 30 minutes apart.  He will continue to keep him home blood pressure diary and reach out to the office in about 2 weeks.  Orders:  -     amLODIPine-benazepril (LOTREL) 10-20 MG per capsule; Take 1 capsule by mouth daily  2. HARTMANN (dyspnea on exertion)  Assessment & Plan:  Patient recently underwent treadmill stress echo in September 2024 in which she walked for 8-1/2 minutes:  Resting ECG showed normal sinus rhythm with poor R wave progression and PVCs.    Stress ECG: No ST deviation is noted. Arrhythmias during recovery: occasional PVCs. The stress ECG is negative for ischemia after submaximal exercise, without reproduction of symptoms.  Patient complained of dyspnea but no chest pain.    Patient had hypertensive response to treadmill stress.  Exercise tolerance is good.    Recent echocardiogram from September 2024 showed normal-sized LV  with low normal EF of 50% and mild LVH with mild aortic sclerosis and mild mitral regurgitation.  3. PVC (premature ventricular contraction)  Assessment & Plan:  Patient has asymptomatic PVCs.  He is on appropriate doses of beta-blockers.           CHIEF COMPLAINT:      Hypertension, hyperlipidemia, chest pain    HPI:   64-year-old male with past medical history significant for hypertension and borderline hyperlipidemia, presents for follow-up visit after recent undergoing treadmill stress test and echocardiogram for evaluation of atypical chest pain, dyspnea on exertion and hypertension.    He states that he has started taking losartan but continues to notice high blood pressure.    He was recently seen in August 2024 for his first cardiology visit. 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 Symptoms lasted for several hours. There was no associated shortness of breath, palpitations or diaphoresis. He did not have any relief with sublingual nitroglycerin in the ER which gave him a headache but did get relief with intravenous morphine. He does not report any subsequent similar symptoms but still occasionally gets left shoulder pain. He does admit to working in restoring his home deck prior to these symptoms. He is quite active at work but does not do any regular exercise. He denies any palpitations or syncope     Past Medical History:   Diagnosis Date    Hypertension           CURRENT  MEDICATIONS:      Current Outpatient Medications:     amLODIPine-benazepril (LOTREL) 10-20 MG per capsule, Take 1 capsule by mouth daily, Disp: 30 capsule, Rfl: 0    aspirin 81 mg chewable tablet, Chew 81 mg daily, Disp: , Rfl:     metoprolol succinate (TOPROL-XL) 50 mg 24 hr tablet, Take 50 mg by mouth daily, Disp: , Rfl:     ALLERGIES  No Known Allergies    Lab Results   Component Value Date    CREATININE 0.85  08/03/2024    K 3.9 08/03/2024    SODIUM 137 08/03/2024         REVIEW OF SYSTEMS   Positive for: Dizziness and weakness  Negative for: All remaining as reviewed below and in HPI.    SYSTEM SYMPTOMS REVIEWED:  General--weight change, fever, night sweats  Respiratory--cough, wheezing, shortness of breath, sputum production  Cardiovascular--chest pain, syncope, dyspnea on exertion, edema, decline in exercise tolerance, claudication   Gastrointestinal--persistent vomiting, diarrhea, abdominal distention, blood in stool   Muscular or skeletal--joint pain or swelling   Neurologic--headaches, syncope, abnormal movement  Hematologic--history of easy bruising and bleeding   Endocrine--thyroid enlargement, heat or cold intolerance, polyuria   Psychiatric--anxiety, depression     General physical examination:    General appearance: Alert, no acute distress, appears stated age, mild obesity.  HEENT: Mucous membranes are moist.  No obvious abnormality noted.  Neck: Supple with no lymphadenopathy.  No JVD.  Carotid pulses are intact.  No carotid bruit.  Cardiovascular system: Regular rhythm.  Normal S1 and S2.  No murmurs.  No rubs or gallops. Extremities: No edema. No cyanosis.  Pulmonary: Respirations unlabored.  Good air entry bilaterally.  Clear to auscultation bilaterally.  Gastrointestinal: Abdomen is soft and nontender.  Bowel sounds are positive.  Musculoskeletal: Upper Extremities: Normal upper motor strength. Lower Extremity: Normal motor strength. Gait: Normal.   Skin: Skin is warm. No rashes or lesions.  Neurological: Patient is alert and oriented with no gross motor deficits.  Psychiatric: Mood is normal.  Behavior is normal.    Vitals:    10/31/24 1251   BP: (!) 192/100   Pulse: 88   SpO2: 98%      Body mass index is 30.94 kg/m².  Wt Readings from Last 3 Encounters:   10/31/24 109 kg (241 lb)   09/23/24 107 kg (236 lb)   09/16/24 107 kg (236 lb)             Chris Gomez MD, FACC, KIRIT    Portions of the record   "have been created with voice recognition software.  Occasional grammatical mistakes or wrong word or \"sound alike\" substitutions may have occurred due to the inherent limitations of voice recognition software. Please reach out to me directly for any clarifications.   "

## 2024-10-31 NOTE — ASSESSMENT & PLAN NOTE
Blood pressure remains suboptimally controlled.  His home blood pressure log from September all the way to end of October show systolics ranging 150-170 and diastolic in the 75-85 range with heart rate in the 60s.  He tells me that a few days after starting the losartan 50 mg he had several episodes of feeling profound weakness and fatigue.  He did not take his blood pressure during these episodes.  We will change losartan to amlodipine benazepril combination and see if he tolerates that better.  I have also advised him to take his metoprolol and the Lotrel 30 minutes apart.  He will continue to keep him home blood pressure diary and reach out to the office in about 2 weeks.

## 2024-10-31 NOTE — ASSESSMENT & PLAN NOTE
Patient recently underwent treadmill stress echo in September 2024 in which she walked for 8-1/2 minutes:  Resting ECG showed normal sinus rhythm with poor R wave progression and PVCs.    Stress ECG: No ST deviation is noted. Arrhythmias during recovery: occasional PVCs. The stress ECG is negative for ischemia after submaximal exercise, without reproduction of symptoms.  Patient complained of dyspnea but no chest pain.    Patient had hypertensive response to treadmill stress.  Exercise tolerance is good.    Recent echocardiogram from September 2024 showed normal-sized LV with low normal EF of 50% and mild LVH with mild aortic sclerosis and mild mitral regurgitation.

## 2024-12-02 DIAGNOSIS — I10 PRIMARY HYPERTENSION: ICD-10-CM

## 2024-12-03 RX ORDER — AMLODIPINE AND BENAZEPRIL HYDROCHLORIDE 10; 20 MG/1; MG/1
1 CAPSULE ORAL DAILY
Qty: 30 CAPSULE | Refills: 1 | Status: SHIPPED | OUTPATIENT
Start: 2024-12-03

## 2025-01-24 ENCOUNTER — OFFICE VISIT (OUTPATIENT)
Dept: CARDIOLOGY CLINIC | Facility: CLINIC | Age: 65
End: 2025-01-24
Payer: MEDICARE

## 2025-01-24 VITALS
DIASTOLIC BLOOD PRESSURE: 79 MMHG | TEMPERATURE: 98 F | BODY MASS INDEX: 30.8 KG/M2 | OXYGEN SATURATION: 98 % | HEART RATE: 72 BPM | WEIGHT: 240 LBS | HEIGHT: 74 IN | SYSTOLIC BLOOD PRESSURE: 172 MMHG

## 2025-01-24 DIAGNOSIS — R06.09 DOE (DYSPNEA ON EXERTION): ICD-10-CM

## 2025-01-24 DIAGNOSIS — I49.3 PVC (PREMATURE VENTRICULAR CONTRACTION): ICD-10-CM

## 2025-01-24 DIAGNOSIS — I10 PRIMARY HYPERTENSION: Primary | ICD-10-CM

## 2025-01-24 PROCEDURE — G2211 COMPLEX E/M VISIT ADD ON: HCPCS | Performed by: INTERNAL MEDICINE

## 2025-01-24 PROCEDURE — 99214 OFFICE O/P EST MOD 30 MIN: CPT | Performed by: INTERNAL MEDICINE

## 2025-01-24 RX ORDER — BENAZEPRIL HYDROCHLORIDE 20 MG/1
20 TABLET ORAL DAILY
Qty: 90 TABLET | Refills: 3 | Status: SHIPPED | OUTPATIENT
Start: 2025-01-24

## 2025-01-24 RX ORDER — AMLODIPINE AND BENAZEPRIL HYDROCHLORIDE 10; 20 MG/1; MG/1
1 CAPSULE ORAL DAILY
Qty: 90 CAPSULE | Refills: 3 | Status: SHIPPED | OUTPATIENT
Start: 2025-01-24

## 2025-01-24 NOTE — PROGRESS NOTES
[facility] [place]  [address]  [address]  [facility] [address] [phone]    Patient Name: Maia Crystal  YOB: 1960 ;male  MR No: [id number]        Diagnosis ICD-10-CM Associated Orders   1. Primary hypertension  I10       2. PVC (premature ventricular contraction)  I49.3       3. HARTMANN (dyspnea on exertion)  R06.09            Assessment and recommendations:    1. Primary hypertension  2. PVC (premature ventricular contraction)  3. HARTMANN (dyspnea on exertion)         Patient is tolerating the amlodipine benazepril combination better than he did the losartan which led to profound weakness and fatigue.  However since his blood pressure numbers are still not optimal, we will add additional 20 mg of benazepril in the morning and he will continue to keep a regular home blood pressure diary.    Patient continues to have asymptomatic PVCs.  He reports no recurrent chest pain or significant dyspnea on exertion. Patient recently underwent treadmill stress echo in September 2024 in which he walked for 8-1/2 minutes: Resting ECG showed normal sinus rhythm with poor R wave progression and PVCs.    Stress ECG: No ST deviation is noted. Arrhythmias during recovery: occasional PVCs. The stress ECG is negative for ischemia after submaximal exercise, without reproduction of symptoms.  Patient complained of dyspnea but no chest pain.    Patient had hypertensive response to treadmill stress.  Exercise tolerance is good.  Recent echocardiogram from September 2024 showed normal-sized LV with low normal EF of 50% and mild LVH with mild aortic sclerosis and mild mitral regurgitation.      CHIEF COMPLAINT:      Hypertension    HPI:   64-year-old male with past medical history significant for hypertension and borderline hyperlipidemia, presents for follow-up.  He reports that his blood pressure still remains less than optimal control with numbers ranging from 1 30-1  45 systolic.  He also gets brief lightheadedness when he gets up from a squatting position.  He remains quite active and  denies any chest pain or dyspnea on exertion.    He was  seen in August 2024 for his first cardiology visit. 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 Symptoms lasted for several hours. There was no associated shortness of breath, palpitations or diaphoresis. He did not have any relief with sublingual nitroglycerin in the ER which gave him a headache but did get relief with intravenous morphine.  Past Medical History:   Diagnosis Date    Hypertension           CURRENT  MEDICATIONS:      Current Outpatient Medications:     amLODIPine-benazepril (LOTREL) 10-20 MG per capsule, Take 1 capsule by mouth daily, Disp: 30 capsule, Rfl: 1    aspirin 81 mg chewable tablet, Chew 81 mg daily, Disp: , Rfl:     metoprolol succinate (TOPROL-XL) 50 mg 24 hr tablet, Take 50 mg by mouth daily, Disp: , Rfl:     ALLERGIES  No Known Allergies    Lab Results   Component Value Date    CREATININE 0.85 08/03/2024    K 3.9 08/03/2024    SODIUM 137 08/03/2024         REVIEW OF SYSTEMS   Positive for: Occasional dizziness  Negative for: All remaining as reviewed below and in HPI.    SYSTEM SYMPTOMS REVIEWED:  General--weight change, fever, night sweats  Respiratory--cough, wheezing, shortness of breath, sputum production  Cardiovascular--chest pain, syncope, dyspnea on exertion, edema, decline in exercise tolerance, claudication   Gastrointestinal--persistent vomiting, diarrhea, abdominal distention, blood in stool   Muscular or skeletal--joint pain or swelling   Neurologic--headaches, syncope, abnormal movement  Hematologic--history of easy bruising and bleeding   Endocrine--thyroid enlargement, heat or cold intolerance, polyuria   Psychiatric--anxiety, depression     General physical examination:    General  "appearance: Alert, no acute distress, appears stated age, mildly overweight  HEENT: Mucous membranes are moist.  No obvious abnormality noted.  Neck: Supple with no lymphadenopathy.  No JVD.  Carotid pulses are intact.  No carotid bruit.  Cardiovascular system: Regular rhythm with occasional ectopy.  Normal S1 and S2.  No murmurs.  No rubs or gallops. Extremities: No edema. No cyanosis.  Pulmonary: Respirations unlabored.  Good air entry bilaterally.  Clear to auscultation bilaterally.  Gastrointestinal: Abdomen is soft and nontender.  Bowel sounds are positive.  Musculoskeletal: Upper Extremities: Normal upper motor strength. Lower Extremity: Normal motor strength. Gait: Normal.   Skin: Skin is warm. No rashes or lesions.  Neurological: Patient is alert and oriented with no gross motor deficits.  Psychiatric: Mood is normal.  Behavior is normal.    Vitals:    01/24/25 1528   BP: (!) 172/79   Pulse: 72   Temp: 98 °F (36.7 °C)   SpO2: 98%      Body mass index is 30.81 kg/m².  Wt Readings from Last 3 Encounters:   01/24/25 109 kg (240 lb)   10/31/24 109 kg (241 lb)   09/23/24 107 kg (236 lb)             Chris Gomez MD, FACC, KIRIT    Portions of the record  have been created with voice recognition software.  Occasional grammatical mistakes or wrong word or \"sound alike\" substitutions may have occurred due to the inherent limitations of voice recognition software. Please reach out to me directly for any clarifications.   "

## 2025-01-24 NOTE — LETTER
January 24, 2025     Patient: Maia Crystal  YOB: 1960  Date of Visit: 1/24/2025      To Whom it May Concern:    Maia Crystal is under my professional care. Maia was seen in my office on 1/24/2025.     If you have any questions or concerns, please don't hesitate to call.         Sincerely,          Chris Gomez MD        CC: No Recipients

## 2025-01-24 NOTE — LETTER
January 24, 2025     Patient: Maia Crystal  YOB: 1960  Date of Visit: 1/24/2025      To Whom it May Concern:    Maia Crystal is under my professional care. Maia was seen in my office on 1/24/2025. Maia may return to work on 1/27/2025 .    If you have any questions or concerns, please don't hesitate to call.         Sincerely,          Chris Gomez MD